# Patient Record
Sex: MALE | Race: WHITE | NOT HISPANIC OR LATINO | Employment: OTHER | URBAN - METROPOLITAN AREA
[De-identification: names, ages, dates, MRNs, and addresses within clinical notes are randomized per-mention and may not be internally consistent; named-entity substitution may affect disease eponyms.]

---

## 2017-01-09 ENCOUNTER — OFFICE VISIT (OUTPATIENT)
Dept: URGENT CARE | Facility: CLINIC | Age: 82
End: 2017-01-09
Payer: MEDICARE

## 2017-01-09 VITALS
TEMPERATURE: 7.9 F | HEIGHT: 70 IN | WEIGHT: 180 LBS | BODY MASS INDEX: 25.77 KG/M2 | HEART RATE: 78 BPM | SYSTOLIC BLOOD PRESSURE: 114 MMHG | RESPIRATION RATE: 16 BRPM | OXYGEN SATURATION: 98 % | DIASTOLIC BLOOD PRESSURE: 58 MMHG

## 2017-01-09 DIAGNOSIS — H61.23 BILATERAL IMPACTED CERUMEN: ICD-10-CM

## 2017-01-09 DIAGNOSIS — H65.192 ACUTE EFFUSION OF LEFT EAR: ICD-10-CM

## 2017-01-09 PROCEDURE — 99214 OFFICE O/P EST MOD 30 MIN: CPT | Performed by: PHYSICIAN ASSISTANT

## 2017-01-09 ASSESSMENT — ENCOUNTER SYMPTOMS
SORE THROAT: 0
SHORTNESS OF BREATH: 0
COUGH: 0
FEVER: 0
HEADACHES: 0
PALPITATIONS: 0
WHEEZING: 0
CHILLS: 0

## 2017-01-09 NOTE — MR AVS SNAPSHOT
"        Gt Martinez   2017 4:45 PM   Office Visit   MRN: [id number]    Department:  [facility]   Dept Phone:  [phone]    Description:  Male : 1935   Provider:  Alban Wise PA-C           Reason for Visit     Cerumen Impaction thinking wax, ear popping       Allergies as of 2017     No Known Allergies      You were diagnosed with     Bilateral impacted cerumen   [241443]       Acute effusion of left ear   [1839672]         Vital Signs     Blood Pressure Pulse Temperature Respirations Height Weight    114/58 mmHg 78 -13.4 °C (7.9 °F) 16 1.778 m (5' 10\") 81.647 kg (180 lb)    Body Mass Index Oxygen Saturation Smoking Status             25.83 kg/m2 98% Never Smoker          Basic Information     Date Of Birth Sex Race Ethnicity Preferred Language    1935 Male White Non- English      Problem List              ICD-10-CM Priority Class Noted - Resolved    Trigger finger, acquired M65.30   2014 - Present      Health Maintenance        Date Due Completion Dates    IMM DTaP/Tdap/Td Vaccine (1 - Tdap) 7/3/1956 ---    COLONOSCOPY 7/3/1987 ---    IMM ZOSTER VACCINE 7/3/1997 ---    IMM PNEUMOCOCCAL 65+ (ADULT) LOW/MEDIUM RISK SERIES (1 of 2 - PCV13) 7/3/2002 ---    IMM INFLUENZA (1) 2016 ---            Current Immunizations     No immunizations on file.      Below and/or attached are the medications your provider expects you to take. Review all of your home medications and newly ordered medications with your provider and/or pharmacist. Follow medication instructions as directed by your provider and/or pharmacist. Please keep your medication list with you and share with your provider. Update the information when medications are discontinued, doses are changed, or new medications (including over-the-counter products) are added; and carry medication information at all times in the event of emergency situations     Allergies:  No Known Allergies          Medications  Valid as " of: January 09, 2017 -  5:47 PM    Generic Name Brand Name Tablet Size Instructions for use    Aspirin (Tablet Delayed Response) ECOTRIN 81 MG Take 81 mg by mouth every day.        Azithromycin (Tab) ZITHROMAX 250 MG Z-pack, Use as directed        DiazePAM (Tab) VALIUM 5 MG Take 5 mg by mouth every 6 hours as needed.        Dutasteride (Cap) AVODART 0.5 MG Take 1 Cap by mouth every day.        Eszopiclone (Tab) Eszopiclone 2 MG Take  by mouth.        Hydrocod Polst-Chlorphen Polst (Liquid CR) TUSSIONEX 10-8 MG/5ML Take 5 mL by mouth every 12 hours.        Meloxicam (Tab) MOBIC 7.5 MG Take 7.5 mg by mouth every day.        Misc Natural Products (Tab) OSTEO BI-FLEX ADV DOUBLE ST  Take  by mouth every day.        Multiple Vitamins-Minerals (Tab) THERAGRAN-M  Take 1 Tab by mouth every day.        Omega-3 Fatty Acids (Cap) OMEGA 3 FA 1000 MG Take 1,000 mg by mouth every day.        Potassium   Take  by mouth.        Simvastatin (Tab) ZOCOR 40 MG Take 1 Tab by mouth every evening.        .                 Medicines prescribed today were sent to:     [facility] #[id number] - [place], [place] - [address] [address]    Phone: [phone] Fax: [phone]    Open 24 Hours?: No      Medication refill instructions:       If your prescription bottle indicates you have medication refills left, it is not necessary to call your provider’s office. Please contact your pharmacy and they will refill your medication.    If your prescription bottle indicates you do not have any refills left, you may request refills at any time through one of the following ways: The online [employer] system (except Urgent Care), by calling your provider’s office, or by asking your pharmacy to contact your provider’s office with a refill request. Medication refills are processed only during regular business hours and may not be available until the next business day. Your provider may request additional information or  to have a follow-up visit with you prior to refilling your medication.   *Please Note: Medication refills are assigned a new Rx number when refilled electronically. Your pharmacy may indicate that no refills were authorized even though a new prescription for the same medication is available at the pharmacy. Please request the medicine by name with the pharmacy before contacting your provider for a refill.        Instructions    e4          [employer] Access Code: Activation code not generated  Current [employer] Status: Active

## 2017-01-10 NOTE — PROGRESS NOTES
Subjective:      Gt Martinez is a 81 y.o. male who presents with Cerumen Impaction            Cerumen Impaction  This is a new problem. Episode onset: one week. The problem has been unchanged. Pertinent negatives include no chest pain, chills, congestion, coughing, fever, headaches or sore throat. Nothing aggravates the symptoms. Treatments tried: eardrops. The treatment provided no relief.       Review of Systems   Constitutional: Negative for fever and chills.   HENT: Positive for ear pain. Negative for congestion and sore throat.    Respiratory: Negative for cough, shortness of breath and wheezing.    Cardiovascular: Negative for chest pain and palpitations.   Neurological: Negative for headaches.     All other systems reviewed and are negative.  PMH:  has a past medical history of Heart burn; Cholesterol blood decreased; and Trigger ring finger of left hand.  MEDS:   Current outpatient prescriptions:   •  Potassium (POTASSIMIN PO), Take  by mouth., Disp: , Rfl:   •  azithromycin (ZITHROMAX) 250 MG Tab, Z-pack, Use as directed, Disp: 6 Tab, Rfl: 0  •  hydrocod polst-chlorphen polst (TUSSIONEX) 10-8 MG/5ML Liquid CR, Take 5 mL by mouth every 12 hours., Disp: 120 mL, Rfl: 0  •  Eszopiclone (LUNESTA) 2 MG TABS, Take  by mouth., Disp: , Rfl:   •  diazepam (VALIUM) 5 MG TABS, Take 5 mg by mouth every 6 hours as needed., Disp: , Rfl:   •  meloxicam (MOBIC) 7.5 MG TABS, Take 7.5 mg by mouth every day., Disp: , Rfl:   •  aspirin EC (ECOTRIN) 81 MG TBEC, Take 81 mg by mouth every day., Disp: , Rfl:   •  docosahexanoic acid (OMEGA 3 FA) 1000 MG CAPS, Take 1,000 mg by mouth every day., Disp: , Rfl:   •  therapeutic multivitamin-minerals (THERAGRAN-M) TABS, Take 1 Tab by mouth every day., Disp: , Rfl:   •  Misc Natural Products (OSTEO BI-FLEX ADV DOUBLE ST) TABS, Take  by mouth every day., Disp: , Rfl:   •  simvastatin (ZOCOR) 40 MG TABS, Take 1 Tab by mouth every evening., Disp: , Rfl:   •  dutaseride (AVODART) 0.5 MG  "capsule, Take 1 Cap by mouth every day., Disp: , Rfl:   ALLERGIES: No Known Allergies  SURGHX:   Past Surgical History   Procedure Laterality Date   • Other orthopedic surgery       bilat knees, lt shoulder, rt elbow   • Trigger finger release  4/22/2014     Performed by Glenny Martinez M.D. at SURGERY [facility]     SOCHX:  reports that he has never smoked. He has never used smokeless tobacco. He reports that he drinks about 0.5 oz of alcohol per week. He reports that he does not use illicit drugs.  FH: Family history was reviewed, no pertinent findings to report  Medications, Allergies, and current problem list reviewed today in Epic       Objective:     /58 mmHg  Pulse 78  Temp(Src) -13.4 °C (7.9 °F)  Resp 16  Ht 1.778 m (5' 10\")  Wt 81.647 kg (180 lb)  BMI 25.83 kg/m2  SpO2 98%     Physical Exam   Constitutional: He is oriented to person, place, and time. He appears well-developed and well-nourished.   HENT:   Head: Normocephalic and atraumatic.   Right Ear: Hearing, external ear and ear canal normal. A middle ear effusion is present.   Left Ear: Hearing, tympanic membrane, external ear and ear canal normal.   Mouth/Throat: Uvula is midline, oropharynx is clear and moist and mucous membranes are normal.   Neck: Normal range of motion. Neck supple.   Cardiovascular: Normal rate, regular rhythm, normal heart sounds and intact distal pulses.  Exam reveals no gallop and no friction rub.    No murmur heard.  Pulmonary/Chest: Effort normal and breath sounds normal. No accessory muscle usage. No apnea, no tachypnea and no bradypnea. No respiratory distress. He has no decreased breath sounds. He has no wheezes. He has no rhonchi. He has no rales. He exhibits no tenderness.   Neurological: He is alert and oriented to person, place, and time.   Skin: Skin is warm and dry.   Psychiatric: He has a normal mood and affect. His behavior is normal. Judgment and thought content normal.   Vitals reviewed.       "        Assessment/Plan:   Patient is a 81 year-old male who presents with left ear pain for 7 days. Patient denies fever chills night sweats. History of cerumen impaction. Patient has tried nothing for the symptoms. Vital signs are normal. Physical exam shows bilateral cerumen impaction. Cerumen was dislodged by ear lavage. Left ear effusion was appreciated.    1. Bilateral impacted cerumen  -Ear Lavage    2. Acute effusion of left ear  -Sudafed    Differential diagnosis, natural history, supportive care, and indications for immediate follow-up discussed at length.   Follow-up with primary care provider within 4-5 days, emergency room precautions discussed.  Patient and/or family appears understanding of information.

## 2017-05-13 ENCOUNTER — APPOINTMENT (OUTPATIENT)
Dept: RADIOLOGY | Facility: IMAGING CENTER | Age: 82
End: 2017-05-13
Attending: PHYSICIAN ASSISTANT
Payer: MEDICARE

## 2017-05-13 ENCOUNTER — OFFICE VISIT (OUTPATIENT)
Dept: URGENT CARE | Facility: CLINIC | Age: 82
End: 2017-05-13
Payer: MEDICARE

## 2017-05-13 VITALS
WEIGHT: 175 LBS | BODY MASS INDEX: 25.11 KG/M2 | HEART RATE: 74 BPM | OXYGEN SATURATION: 98 % | RESPIRATION RATE: 16 BRPM | DIASTOLIC BLOOD PRESSURE: 58 MMHG | TEMPERATURE: 98.2 F | SYSTOLIC BLOOD PRESSURE: 108 MMHG

## 2017-05-13 DIAGNOSIS — S92.912A FRACTURE OF DISTAL PHALANX OF TOE OF LEFT FOOT: ICD-10-CM

## 2017-05-13 DIAGNOSIS — S99.922A INJURY OF TOE ON LEFT FOOT, INITIAL ENCOUNTER: ICD-10-CM

## 2017-05-13 PROCEDURE — G8432 DEP SCR NOT DOC, RNG: HCPCS | Performed by: PHYSICIAN ASSISTANT

## 2017-05-13 PROCEDURE — 99213 OFFICE O/P EST LOW 20 MIN: CPT | Performed by: PHYSICIAN ASSISTANT

## 2017-05-13 PROCEDURE — 1036F TOBACCO NON-USER: CPT | Performed by: PHYSICIAN ASSISTANT

## 2017-05-13 PROCEDURE — 1101F PT FALLS ASSESS-DOCD LE1/YR: CPT | Mod: 8P | Performed by: PHYSICIAN ASSISTANT

## 2017-05-13 PROCEDURE — 73660 X-RAY EXAM OF TOE(S): CPT | Mod: TC,LT | Performed by: PHYSICIAN ASSISTANT

## 2017-05-13 PROCEDURE — G8419 CALC BMI OUT NRM PARAM NOF/U: HCPCS | Performed by: PHYSICIAN ASSISTANT

## 2017-05-13 PROCEDURE — 4040F PNEUMOC VAC/ADMIN/RCVD: CPT | Mod: 8P | Performed by: PHYSICIAN ASSISTANT

## 2017-05-13 NOTE — MR AVS SNAPSHOT
Gt Martinez   2017 3:30 PM   Office Visit   MRN: [id number]    Department:  [facility]   Dept Phone:  [phone]    Description:  Male : 1935   Provider:  Mee Gilbert PA-C           Reason for Visit     Toe Injury big toe left foot      Allergies as of 2017     No Known Allergies      You were diagnosed with     Injury of toe on left foot, initial encounter   [[id number]]         Vital Signs     Blood Pressure Pulse Temperature Respirations Weight Oxygen Saturation    108/58 mmHg 74 36.8 °C (98.2 °F) 16 79.379 kg (175 lb) 98%    Smoking Status                   Never Smoker            Basic Information     Date Of Birth Sex Race Ethnicity Preferred Language    1935 Male White Non- English      Problem List              ICD-10-CM Priority Class Noted - Resolved    Trigger finger, acquired M65.30   2014 - Present      Health Maintenance        Date Due Completion Dates    IMM DTaP/Tdap/Td Vaccine (1 - Tdap) 7/3/1956 ---    COLONOSCOPY 7/3/1987 ---    IMM ZOSTER VACCINE 7/3/1997 ---    IMM PNEUMOCOCCAL 65+ (ADULT) LOW/MEDIUM RISK SERIES (1 of 2 - PCV13) 7/3/2002 ---            Current Immunizations     No immunizations on file.      Below and/or attached are the medications your provider expects you to take. Review all of your home medications and newly ordered medications with your provider and/or pharmacist. Follow medication instructions as directed by your provider and/or pharmacist. Please keep your medication list with you and share with your provider. Update the information when medications are discontinued, doses are changed, or new medications (including over-the-counter products) are added; and carry medication information at all times in the event of emergency situations     Allergies:  No Known Allergies          Medications  Valid as of: May 13, 2017 -  4:44 PM    Generic Name Brand Name Tablet Size Instructions for use    Aspirin (Tablet Delayed  Response) ECOTRIN 81 MG Take 81 mg by mouth every day.        Azithromycin (Tab) ZITHROMAX 250 MG Z-pack, Use as directed        DiazePAM (Tab) VALIUM 5 MG Take 5 mg by mouth every 6 hours as needed.        Dutasteride (Cap) AVODART 0.5 MG Take 1 Cap by mouth every day.        Eszopiclone (Tab) Eszopiclone 2 MG Take  by mouth.        Hydrocod Polst-Chlorphen Polst (Liquid CR) TUSSIONEX 10-8 MG/5ML Take 5 mL by mouth every 12 hours.        Meloxicam (Tab) MOBIC 7.5 MG Take 7.5 mg by mouth every day.        Misc Natural Products (Tab) OSTEO BI-FLEX ADV DOUBLE ST  Take  by mouth every day.        Multiple Vitamins-Minerals (Tab) THERAGRAN-M  Take 1 Tab by mouth every day.        Omega-3 Fatty Acids (Cap) OMEGA 3 FA 1000 MG Take 1,000 mg by mouth every day.        Potassium   Take  by mouth.        Simvastatin (Tab) ZOCOR 40 MG Take 1 Tab by mouth every evening.        .                 Medicines prescribed today were sent to:     [facility] #[id number] [place], [place] - [address] [place] [address]    Phone: [phone] Fax: [phone]    Open 24 Hours?: No      Medication refill instructions:       If your prescription bottle indicates you have medication refills left, it is not necessary to call your provider’s office. Please contact your pharmacy and they will refill your medication.    If your prescription bottle indicates you do not have any refills left, you may request refills at any time through one of the following ways: The online [employer] system (except Urgent Care), by calling your provider’s office, or by asking your pharmacy to contact your provider’s office with a refill request. Medication refills are processed only during regular business hours and may not be available until the next business day. Your provider may request additional information or to have a follow-up visit with you prior to refilling your medication.   *Please Note: Medication refills are assigned  a new Rx number when refilled electronically. Your pharmacy may indicate that no refills were authorized even though a new prescription for the same medication is available at the pharmacy. Please request the medicine by name with the pharmacy before contacting your provider for a refill.        Your To Do List     Future Labs/Procedures Complete By Expires    DX-TOE(S) 2+ LEFT  As directed 5/13/2018         [employer] Access Code: Activation code not generated  Current [employer] Status: Active

## 2017-05-20 ASSESSMENT — ENCOUNTER SYMPTOMS
BRUISES/BLEEDS EASILY: 0
CONSTITUTIONAL NEGATIVE: 1
NEUROLOGICAL NEGATIVE: 1

## 2017-05-21 NOTE — PROGRESS NOTES
"Subjective:      Gt Martinez is a 81 y.o. male who presents with Toe Injury        Toe Injury    Patient presents today for left great toe injury sustained yesterday.  He is concerned he broke it.  He hit it against corner and it pulled the toe to the side, \"stubbed it\"  He has noticed some swelling. He has been able to put on a shoe and walk on it but has been painful.  No attempted interventions at this point other than coming here.     Review of Systems   Constitutional: Negative.    Musculoskeletal:        SEE HPI, LEFT GREAT TOE   Skin: Negative.    Neurological: Negative.    Endo/Heme/Allergies: Does not bruise/bleed easily.   All other systems reviewed and are negative.     PMH:  has a past medical history of Heart burn; Cholesterol blood decreased; and Trigger ring finger of left hand.  MEDS:   Current outpatient prescriptions:   •  meloxicam (MOBIC) 7.5 MG TABS, Take 7.5 mg by mouth every day., Disp: , Rfl:   •  dutaseride (AVODART) 0.5 MG capsule, Take 1 Cap by mouth every day., Disp: , Rfl:   •  Potassium (POTASSIMIN PO), Take  by mouth., Disp: , Rfl:   •  azithromycin (ZITHROMAX) 250 MG Tab, Z-pack, Use as directed, Disp: 6 Tab, Rfl: 0  •  hydrocod polst-chlorphen polst (TUSSIONEX) 10-8 MG/5ML Liquid CR, Take 5 mL by mouth every 12 hours., Disp: 120 mL, Rfl: 0  •  Eszopiclone (LUNESTA) 2 MG TABS, Take  by mouth., Disp: , Rfl:   •  diazepam (VALIUM) 5 MG TABS, Take 5 mg by mouth every 6 hours as needed., Disp: , Rfl:   •  aspirin EC (ECOTRIN) 81 MG TBEC, Take 81 mg by mouth every day., Disp: , Rfl:   •  docosahexanoic acid (OMEGA 3 FA) 1000 MG CAPS, Take 1,000 mg by mouth every day., Disp: , Rfl:   •  therapeutic multivitamin-minerals (THERAGRAN-M) TABS, Take 1 Tab by mouth every day., Disp: , Rfl:   •  Misc Natural Products (OSTEO BI-FLEX ADV DOUBLE ST) TABS, Take  by mouth every day., Disp: , Rfl:   •  simvastatin (ZOCOR) 40 MG TABS, Take 1 Tab by mouth every evening., Disp: , Rfl:   ALLERGIES: No " Known Allergies  SURGHX:   Past Surgical History   Procedure Laterality Date   • Other orthopedic surgery       bilat knees, lt shoulder, rt elbow   • Trigger finger release  4/22/2014     Performed by Glenny Martinez M.D. at SURGERY [facility]     SOCHX:  reports that he has never smoked. He has never used smokeless tobacco. He reports that he drinks about 0.5 oz of alcohol per week. He reports that he does not use illicit drugs.  FH: Family history was reviewed, no pertinent findings to report     Objective:     /58 mmHg  Pulse 74  Temp(Src) 36.8 °C (98.2 °F)  Resp 16  Wt 79.379 kg (175 lb)  SpO2 98%     Physical Exam   Constitutional: He is oriented to person, place, and time. He appears well-developed and well-nourished. No distress.   HENT:   Head: Normocephalic and atraumatic.   Cardiovascular: Normal rate and regular rhythm.    Pulmonary/Chest: Effort normal and breath sounds normal.   Musculoskeletal:        Feet:    Neurological: He is alert and oriented to person, place, and time.   Skin: Skin is warm and dry. No rash noted.   Vitals reviewed.       RAD        Impression        Possible nondisplaced fracture involving the base of the first distal phalanx at the lateral aspect.         Reading Provider Reading Date     Kenny Galarza M.D. May 13, 2017        Assessment/Plan:     1. Fracture of distal phalanx of toe of left foot  DX-TOE(S) 2+ LEFT     -RAD as above.   -pura taped and with padding placed between digits, post-op shoe for support.   -elevate, rest, ice.  NSAID/Tylenol for pain, declined anything stronger  -PCP follow up, Ortho or RTC if pain not improving with treatment or worsens at anytime.     Supportive care, differential diagnoses, and indications for immediate follow-up discussed with patient.   Pathogenesis of diagnosis discussed including typical length and natural progression.   Instructed to return to clinic or nearest emergency department for any change in  condition, further concerns, or worsening of symptoms.  Patient states understanding of the plan of care and discharge instructions.  Instructed to make an appointment, for follow up, with their primary care provider.      Mee Gilbert PA-C

## 2017-08-04 ENCOUNTER — HOSPITAL ENCOUNTER (OUTPATIENT)
Dept: LAB | Facility: MEDICAL CENTER | Age: 82
End: 2017-08-04
Attending: FAMILY MEDICINE
Payer: MEDICARE

## 2017-08-04 LAB
ALBUMIN SERPL BCP-MCNC: 4.1 G/DL (ref 3.2–4.9)
ALBUMIN/GLOB SERPL: 1.4 G/DL
ALP SERPL-CCNC: 50 U/L (ref 30–99)
ALT SERPL-CCNC: 12 U/L (ref 2–50)
ANION GAP SERPL CALC-SCNC: 6 MMOL/L (ref 0–11.9)
AST SERPL-CCNC: 17 U/L (ref 12–45)
BASOPHILS # BLD AUTO: 0.9 % (ref 0–1.8)
BASOPHILS # BLD: 0.05 K/UL (ref 0–0.12)
BILIRUB SERPL-MCNC: 0.5 MG/DL (ref 0.1–1.5)
BUN SERPL-MCNC: 21 MG/DL (ref 8–22)
CALCIUM SERPL-MCNC: 9.3 MG/DL (ref 8.5–10.5)
CHLORIDE SERPL-SCNC: 105 MMOL/L (ref 96–112)
CHOLEST SERPL-MCNC: 182 MG/DL (ref 100–199)
CO2 SERPL-SCNC: 26 MMOL/L (ref 20–33)
CREAT SERPL-MCNC: 0.78 MG/DL (ref 0.5–1.4)
EOSINOPHIL # BLD AUTO: 0.19 K/UL (ref 0–0.51)
EOSINOPHIL NFR BLD: 3.2 % (ref 0–6.9)
ERYTHROCYTE [DISTWIDTH] IN BLOOD BY AUTOMATED COUNT: 47.2 FL (ref 35.9–50)
GFR SERPL CREATININE-BSD FRML MDRD: >60 ML/MIN/1.73 M 2
GLOBULIN SER CALC-MCNC: 3 G/DL (ref 1.9–3.5)
GLUCOSE SERPL-MCNC: 98 MG/DL (ref 65–99)
HCT VFR BLD AUTO: 43.5 % (ref 42–52)
HDLC SERPL-MCNC: 44 MG/DL
HGB BLD-MCNC: 13.8 G/DL (ref 14–18)
IMM GRANULOCYTES # BLD AUTO: 0.01 K/UL (ref 0–0.11)
IMM GRANULOCYTES NFR BLD AUTO: 0.2 % (ref 0–0.9)
LDLC SERPL CALC-MCNC: 105 MG/DL
LYMPHOCYTES # BLD AUTO: 2.11 K/UL (ref 1–4.8)
LYMPHOCYTES NFR BLD: 36.1 % (ref 22–41)
MCH RBC QN AUTO: 29.4 PG (ref 27–33)
MCHC RBC AUTO-ENTMCNC: 31.7 G/DL (ref 33.7–35.3)
MCV RBC AUTO: 92.8 FL (ref 81.4–97.8)
MONOCYTES # BLD AUTO: 0.55 K/UL (ref 0–0.85)
MONOCYTES NFR BLD AUTO: 9.4 % (ref 0–13.4)
NEUTROPHILS # BLD AUTO: 2.94 K/UL (ref 1.82–7.42)
NEUTROPHILS NFR BLD: 50.2 % (ref 44–72)
NRBC # BLD AUTO: 0 K/UL
NRBC BLD AUTO-RTO: 0 /100 WBC
PLATELET # BLD AUTO: 176 K/UL (ref 164–446)
PMV BLD AUTO: 12.5 FL (ref 9–12.9)
POTASSIUM SERPL-SCNC: 4.6 MMOL/L (ref 3.6–5.5)
PROT SERPL-MCNC: 7.1 G/DL (ref 6–8.2)
PSA SERPL-MCNC: 0.76 NG/ML (ref 0–4)
RBC # BLD AUTO: 4.69 M/UL (ref 4.7–6.1)
SODIUM SERPL-SCNC: 137 MMOL/L (ref 135–145)
T4 SERPL-MCNC: 7.2 UG/DL (ref 4–12)
TRIGL SERPL-MCNC: 163 MG/DL (ref 0–149)
TSH SERPL DL<=0.005 MIU/L-ACNC: 2.32 UIU/ML (ref 0.3–3.7)
WBC # BLD AUTO: 5.9 K/UL (ref 4.8–10.8)

## 2017-08-04 PROCEDURE — 36415 COLL VENOUS BLD VENIPUNCTURE: CPT

## 2017-08-04 PROCEDURE — 80061 LIPID PANEL: CPT

## 2017-08-04 PROCEDURE — 84443 ASSAY THYROID STIM HORMONE: CPT

## 2017-08-04 PROCEDURE — 85025 COMPLETE CBC W/AUTO DIFF WBC: CPT

## 2017-08-04 PROCEDURE — 84479 ASSAY OF THYROID (T3 OR T4): CPT

## 2017-08-04 PROCEDURE — 84153 ASSAY OF PSA TOTAL: CPT

## 2017-08-04 PROCEDURE — 84436 ASSAY OF TOTAL THYROXINE: CPT

## 2017-08-04 PROCEDURE — 80053 COMPREHEN METABOLIC PANEL: CPT

## 2017-08-06 LAB — T3RU NFR SERPL: 33 % (ref 28–41)

## 2017-09-22 ENCOUNTER — OFFICE VISIT (OUTPATIENT)
Dept: URGENT CARE | Facility: CLINIC | Age: 82
End: 2017-09-22
Payer: MEDICARE

## 2017-09-22 VITALS
TEMPERATURE: 98 F | RESPIRATION RATE: 14 BRPM | DIASTOLIC BLOOD PRESSURE: 72 MMHG | HEART RATE: 65 BPM | BODY MASS INDEX: 23.77 KG/M2 | OXYGEN SATURATION: 94 % | WEIGHT: 166 LBS | HEIGHT: 70 IN | SYSTOLIC BLOOD PRESSURE: 118 MMHG

## 2017-09-22 DIAGNOSIS — T14.8XXA OPEN BITE WOUND OF SKIN: Primary | ICD-10-CM
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
## 2017-09-22 PROCEDURE — 99214 OFFICE O/P EST MOD 30 MIN: CPT | Performed by: FAMILY MEDICINE

## 2017-09-22 RX ORDER — AMOXICILLIN AND CLAVULANATE POTASSIUM 875; 125 MG/1; MG/1
1 TABLET, FILM COATED ORAL 2 TIMES DAILY
Qty: 6 TAB | Refills: 0 | Status: SHIPPED | OUTPATIENT
Start: 2017-09-22 | End: 2017-09-25

## 2017-09-22 ASSESSMENT — ENCOUNTER SYMPTOMS
DIZZINESS: 0
FEVER: 0
ROS SKIN COMMENTS: WOUND
FOCAL WEAKNESS: 0
SENSORY CHANGE: 1
CHILLS: 0

## 2017-09-22 NOTE — PROGRESS NOTES
Subjective:      Gt Martinez is a 82 y.o. male who presents with Animal Bite    Chief Complaint   Patient presents with   • Animal Bite        - This is a very pleasant 82 y.o. male with complaints of his dog bit him a few hrs ago. He was trying to restrain the dog so he could clip its nails. Dog is known to have bad temperament. Dog utd on shots. Patient thinks he is utd on Td but will check his records later and let us know.            ALLERGIES:  Review of patient's allergies indicates no known allergies.     PMH:  Past Medical History:   Diagnosis Date   • Cholesterol blood decreased    • Heart burn    • Trigger ring finger of left hand         MEDS:    Current Outpatient Prescriptions:   •  amoxicillin-clavulanate (AUGMENTIN) 875-125 MG Tab, Take 1 Tab by mouth 2 times a day for 3 days., Disp: 6 Tab, Rfl: 0  •  Potassium (POTASSIMIN PO), Take  by mouth., Disp: , Rfl:   •  diazepam (VALIUM) 5 MG TABS, Take 5 mg by mouth every 6 hours as needed., Disp: , Rfl:   •  meloxicam (MOBIC) 7.5 MG TABS, Take 7.5 mg by mouth every day., Disp: , Rfl:   •  aspirin EC (ECOTRIN) 81 MG TBEC, Take 81 mg by mouth every day., Disp: , Rfl:   •  docosahexanoic acid (OMEGA 3 FA) 1000 MG CAPS, Take 1,000 mg by mouth every day., Disp: , Rfl:   •  therapeutic multivitamin-minerals (THERAGRAN-M) TABS, Take 1 Tab by mouth every day., Disp: , Rfl:   •  Misc Natural Products (OSTEO BI-FLEX ADV DOUBLE ST) TABS, Take  by mouth every day., Disp: , Rfl:   •  simvastatin (ZOCOR) 40 MG TABS, Take 1 Tab by mouth every evening., Disp: , Rfl:   •  dutaseride (AVODART) 0.5 MG capsule, Take 1 Cap by mouth every day., Disp: , Rfl:     ** I have documented what I find to be significant in regards to past medical, social, family and surgical history  in my HPI or under PMH/PSH/FH review section, otherwise it is contributory **           HPI    Review of Systems   Constitutional: Negative for chills and fever.   Skin:        wound   Neurological:  "Positive for sensory change. Negative for dizziness and focal weakness.          Objective:     /72   Pulse 65   Temp 36.7 °C (98 °F)   Resp 14   Ht 1.778 m (5' 10\")   Wt 75.3 kg (166 lb)   SpO2 94%   BMI 23.82 kg/m²      Physical Exam   Constitutional: He appears well-developed. No distress.   HENT:   Head: Normocephalic and atraumatic.   Eyes: Conjunctivae are normal.   Neck: Neck supple.   Cardiovascular: Regular rhythm.    No murmur heard.  Pulmonary/Chest: Effort normal. No respiratory distress.   Neurological: He is alert. He exhibits normal muscle tone.   Skin: Skin is warm and dry.   Psychiatric: He has a normal mood and affect. Judgment normal.   Nursing note and vitals reviewed.  Lt ring finger: full srom, + abrasion            Assessment/Plan:         1. Open bite wound of skin  amoxicillin-clavulanate (AUGMENTIN) 875-125 MG Tab   2. Dog bite, initial encounter           - wound cleaned/dressed/splinted   - 24hr recheck.     Dx & d/c instructions discussed w/ patient and/or family members. Follow up w/ Prvt Dr or here in 3-4 days if not getting better, sooner if needed,  ER if worse and UC/PCP unavailable.        Possible side effects (i.e. Rash, GI upset/constipation, sedation, elevation of BP or sugars) of any medications given discussed.                "

## 2018-02-27 ENCOUNTER — APPOINTMENT (OUTPATIENT)
Dept: MEDICAL GROUP | Facility: MEDICAL CENTER | Age: 83
End: 2018-02-27
Payer: MEDICARE

## 2018-03-27 ENCOUNTER — HOSPITAL ENCOUNTER (OUTPATIENT)
Facility: MEDICAL CENTER | Age: 83
End: 2018-03-27
Attending: FAMILY MEDICINE
Payer: MEDICARE

## 2018-03-27 ENCOUNTER — OFFICE VISIT (OUTPATIENT)
Dept: MEDICAL GROUP | Facility: MEDICAL CENTER | Age: 83
End: 2018-03-27
Payer: MEDICARE

## 2018-03-27 VITALS
HEIGHT: 70 IN | BODY MASS INDEX: 25.91 KG/M2 | OXYGEN SATURATION: 95 % | WEIGHT: 181 LBS | HEART RATE: 64 BPM | TEMPERATURE: 99.1 F | SYSTOLIC BLOOD PRESSURE: 122 MMHG | DIASTOLIC BLOOD PRESSURE: 64 MMHG

## 2018-03-27 DIAGNOSIS — Z87.39 HISTORY OF TRIGGER FINGER: ICD-10-CM

## 2018-03-27 DIAGNOSIS — G47.09 OTHER INSOMNIA: ICD-10-CM

## 2018-03-27 DIAGNOSIS — N40.0 BENIGN PROSTATIC HYPERPLASIA WITHOUT LOWER URINARY TRACT SYMPTOMS: ICD-10-CM

## 2018-03-27 DIAGNOSIS — M25.512 ACUTE PAIN OF LEFT SHOULDER: ICD-10-CM

## 2018-03-27 DIAGNOSIS — E78.2 MIXED HYPERLIPIDEMIA: ICD-10-CM

## 2018-03-27 DIAGNOSIS — F41.9 ANXIETY: ICD-10-CM

## 2018-03-27 DIAGNOSIS — K59.09 CHRONIC CONSTIPATION: ICD-10-CM

## 2018-03-27 DIAGNOSIS — K21.9 GASTROESOPHAGEAL REFLUX DISEASE WITHOUT ESOPHAGITIS: ICD-10-CM

## 2018-03-27 DIAGNOSIS — Z76.89 ENCOUNTER TO ESTABLISH CARE WITH NEW DOCTOR: ICD-10-CM

## 2018-03-27 PROBLEM — K21.00 GASTROESOPHAGEAL REFLUX DISEASE WITH ESOPHAGITIS: Status: RESOLVED | Noted: 2018-03-27 | Resolved: 2018-03-27

## 2018-03-27 PROBLEM — K21.00 GASTROESOPHAGEAL REFLUX DISEASE WITH ESOPHAGITIS: Status: ACTIVE | Noted: 2018-03-27

## 2018-03-27 PROCEDURE — 99215 OFFICE O/P EST HI 40 MIN: CPT | Performed by: FAMILY MEDICINE

## 2018-03-27 PROCEDURE — 80307 DRUG TEST PRSMV CHEM ANLYZR: CPT

## 2018-03-27 RX ORDER — DIAZEPAM 5 MG/1
5 TABLET ORAL
Qty: 30 TAB | Refills: 0 | Status: SHIPPED | OUTPATIENT
Start: 2018-03-27 | End: 2018-05-17 | Stop reason: SDUPTHER

## 2018-03-27 RX ORDER — ESZOPICLONE 2 MG/1
TABLET, FILM COATED ORAL
COMMUNITY
Start: 2018-02-02 | End: 2018-05-17 | Stop reason: SDUPTHER

## 2018-03-27 RX ORDER — DUTASTERIDE 0.5 MG/1
0.5 CAPSULE, LIQUID FILLED ORAL DAILY
Qty: 30 CAP | Refills: 0 | Status: SHIPPED | OUTPATIENT
Start: 2018-03-27 | End: 2018-03-27 | Stop reason: SDUPTHER

## 2018-03-27 RX ORDER — LUBIPROSTONE 24 UG/1
CAPSULE, GELATIN COATED ORAL
COMMUNITY
Start: 2018-03-03

## 2018-03-27 RX ORDER — LANSOPRAZOLE 30 MG/1
CAPSULE, DELAYED RELEASE ORAL
COMMUNITY
Start: 2018-03-03

## 2018-03-27 RX ORDER — DUTASTERIDE 0.5 MG/1
0.5 CAPSULE, LIQUID FILLED ORAL DAILY
Qty: 90 CAP | Refills: 3 | Status: SHIPPED | OUTPATIENT
Start: 2018-03-27 | End: 2018-04-02 | Stop reason: SDUPTHER

## 2018-03-27 ASSESSMENT — PATIENT HEALTH QUESTIONNAIRE - PHQ9: CLINICAL INTERPRETATION OF PHQ2 SCORE: 0

## 2018-03-27 NOTE — ASSESSMENT & PLAN NOTE
Chronic, stable, well controlled on taking Lunesta 1 mg by mouth every at bedtime when necessary to 3 times per week. Patient states that he takes this more often when he has difficulty sleeping due to daytime stresses of his job.

## 2018-03-27 NOTE — LETTER
[facility]  Darian Little M.D.  [address]  [place] [address]  Fax: [phone]   Authorization for Release/Disclosure of   Protected Health Information   Name: ROSARIO MISHRA : 1935 SSN: [id number]   Address: [id number]  [place] [address] Phone:    [phone] (home)    I authorize the entity listed below to release/disclose the PHI below to:   [facility]/Darian Little M.D. and Darian Little M.D.   Provider or Entity Name:  [facility]   Address   [facility], Zip            [address], [place] [address] Phone:  [phone]      Fax:       [phone]          Reason for request: continuity of care   Information to be released:    [ X ] LAST COLONOSCOPY,  including any PATH REPORT and follow-up  [ X ] LAST FIT/COLOGUARD RESULT [  ] LAST DEXA  [  ] LAST MAMMOGRAM  [  ] LAST PAP  [  ] LAST LABS [  ] RETINA EXAM REPORT  [  ] IMMUNIZATION RECORDS  [  ] Release all info      [  ] Check here and initial the line next to each item to release ALL health information INCLUDING  _____ Care and treatment for drug and / or alcohol abuse  _____ HIV testing, infection status, or AIDS  _____ Genetic Testing    DATES OF SERVICE OR TIME PERIOD TO BE DISCLOSED: _____________  I understand and acknowledge that:  * This Authorization may be revoked at any time by you in writing, except if your health information has already been used or disclosed.  * Your health information that will be used or disclosed as a result of you signing this authorization could be re-disclosed by the recipient. If this occurs, your re-disclosed health information may no longer be protected by State or Federal laws.  * You may refuse to sign this Authorization. Your refusal will not affect your ability to obtain treatment.  * This Authorization becomes effective upon signing and will  on (date) __________.      If no date is indicated, this Authorization will  one (1)  year from the signature date.    Name: Gt Martinez    Signature:   Date:     3/27/2018       PLEASE FAX REQUESTED RECORDS BACK TO: [phone]

## 2018-03-27 NOTE — ASSESSMENT & PLAN NOTE
Chronic, stable, well controlled. Patient is taking his Prevacid 30 mg by mouth every other day. Patient would like to eventually get off of this. Patient recently had endoscopy as well as colonoscopy by gastroenterology consultants. Apparently, biopsies were negative.    Of note, patient does have chronic constipation for which she takes Amitiza 24mcg PO BID.    ROS is negative for nausea, emesis, hematochezia, melena, diarrhea.

## 2018-03-28 DIAGNOSIS — F41.9 ANXIETY: ICD-10-CM

## 2018-03-28 LAB
AMPHET UR QL SCN: NEGATIVE
BARBITURATES UR QL SCN: NEGATIVE
BENZODIAZ UR QL SCN: NEGATIVE
BZE UR QL SCN: NEGATIVE
CANNABINOIDS UR QL SCN: POSITIVE
METHADONE UR QL SCN: NEGATIVE
OPIATES UR QL SCN: NEGATIVE
OXYCODONE UR QL SCN: NEGATIVE
PCP UR QL SCN: NEGATIVE
PROPOXYPH UR QL SCN: NEGATIVE

## 2018-03-28 NOTE — ASSESSMENT & PLAN NOTE
Chronic also, well-controlled on taking Valium 5 mg by mouth daily when necessary which she takes 1-2 times per week depending upon work stresses as well as stress that may be induced by his wife.    ROS is NEGATIVE for generalized weakness/fatigue, dizziness, vision/hearing changes, chest pain/pressure, palpitations, dyspnea, tachypnea, intense feelings of dread, insomnia, decreased appetite, persistent nausea.

## 2018-03-28 NOTE — ASSESSMENT & PLAN NOTE
Chronic, stable, well-controlled on Dutasteride.    ROS is NEGATIVE for nocturia, polyuria, increased frequency of urination, urinary hesitancy, feeling of incomplete voiding, difficulty initiation urine stream, hematuria, pyuria

## 2018-04-01 NOTE — ASSESSMENT & PLAN NOTE
Patient reported to have history of hyperlipidemia, is taking his simvastatin as directed, believes that this is well-controlled.  Patient without arthralgias nor joint pains.  Patient without signs/symptoms of angina at present time.  Patient believe that he eats a mostly healthy diet.    ROS is NEGATIVE for dizziness, generalized weakness/fatigue, cold sweats, dizziness,  vision/hearing changes, jaw pain/paresthesias, BUE pain/paresthesias/numbness/weakness, chest pain/pressure, palpitations, dyspnea, nausea, RUQ abdominal pain, oliguria/anuria, BLE edema.

## 2018-04-01 NOTE — ASSESSMENT & PLAN NOTE
Patient had history of trigger finger of left fourth digit. This was addressed in the past by surgery, may have been just prior to that with injections. Currently, patient has decreased strength with this digit, as well as mild decrease in  strength as well. This was discovered on physical exam.

## 2018-04-01 NOTE — PROGRESS NOTES
Subjective:   Chief Complaint/History of Present Illness:  Gt Martinez is a 82 y.o. male patient new to [facility] who presents today to establish primary medical care and to discuss the following medical conditions.      Diagnoses of Encounter to establish care with new doctor, Acute pain of left shoulder, Gastroesophageal reflux disease without esophagitis, Chronic constipation, Other insomnia, Anxiety, Benign prostatic hyperplasia without lower urinary tract symptoms, Mixed hyperlipidemia, and History of trigger finger were pertinent to this visit.    PMH, PSH, Social History, Medications, Allergies all reviewed as documented:    Gastroesophageal reflux disease without esophagitis  Chronic, stable, well controlled. Patient is taking his Prevacid 30 mg by mouth every other day. Patient would like to eventually get off of this. Patient recently had endoscopy as well as colonoscopy by gastroenterology consultants. Apparently, biopsies were negative.    Of note, patient does have chronic constipation for which she takes Amitiza 24mcg PO BID.    ROS is negative for nausea, emesis, hematochezia, melena, diarrhea.    Chronic constipation  Chronic, stable, controlled on taking his Amitiza twice daily.    Other insomnia  Chronic, stable, well controlled on taking Lunesta 1 mg by mouth every at bedtime when necessary to 3 times per week. Patient states that he takes this more often when he has difficulty sleeping due to daytime stresses of his job.    Anxiety  Chronic also, well-controlled on taking Valium 5 mg by mouth daily when necessary which she takes 1-2 times per week depending upon work stresses as well as stress that may be induced by his wife.    ROS is NEGATIVE for generalized weakness/fatigue, dizziness, vision/hearing changes, chest pain/pressure, palpitations, dyspnea, tachypnea, intense feelings of dread, insomnia, decreased appetite, persistent nausea.    Benign prostatic hyperplasia without lower urinary  tract symptoms  Chronic, stable, well-controlled on Dutasteride.    ROS is NEGATIVE for nocturia, polyuria, increased frequency of urination, urinary hesitancy, feeling of incomplete voiding, difficulty initiation urine stream, hematuria, pyuria    Mixed hyperlipidemia  Patient reported to have history of hyperlipidemia, is taking his simvastatin as directed, believes that this is well-controlled.  Patient without arthralgias nor joint pains.  Patient without signs/symptoms of angina at present time.  Patient believe that he eats a mostly healthy diet.    ROS is NEGATIVE for dizziness, generalized weakness/fatigue, cold sweats, dizziness,  vision/hearing changes, jaw pain/paresthesias, BUE pain/paresthesias/numbness/weakness, chest pain/pressure, palpitations, dyspnea, nausea, RUQ abdominal pain, oliguria/anuria, BLE edema.    History of trigger finger  Patient had history of trigger finger of left fourth digit. This was addressed in the past by surgery, may have been just prior to that with injections. Currently, patient has decreased strength with this digit, as well as mild decrease in  strength as well. This was discovered on physical exam.      Patient Active Problem List    Diagnosis Date Noted   • Benign prostatic hyperplasia without lower urinary tract symptoms 03/27/2018   • Other insomnia 03/27/2018   • Mixed hyperlipidemia 03/27/2018   • Gastroesophageal reflux disease without esophagitis 03/27/2018   • Anxiety 03/27/2018   • Chronic constipation 03/27/2018   • Acute pain of left shoulder 03/27/2018   • History of trigger finger 04/22/2014       Additional History:   Allergies:    Patient has no known allergies.     Medications:     Current Outpatient Prescriptions Ordered in [facility]   Medication Sig Dispense Refill   • diazePAM (VALIUM) 5 MG Tab Take 1 Tab by mouth 1 time daily as needed for up to 30 days. 30 Tab 0   • dutasteride (AVODART) 0.5 MG capsule Take 1 Cap by mouth every day. 90 Cap 3   •  AMITIZA 24 MCG capsule      • lansoprazole (PREVACID) 30 MG CAPSULE DELAYED RELEASE      • magnesium oxide (MAG-OX) 400 (241.3 Mg) MG Tab tablet      • Eszopiclone 2 MG Tab      • Potassium (POTASSIMIN PO) Take  by mouth.     • meloxicam (MOBIC) 7.5 MG TABS Take 7.5 mg by mouth every day.     • aspirin EC (ECOTRIN) 81 MG TBEC Take 81 mg by mouth every day.     • docosahexanoic acid (OMEGA 3 FA) 1000 MG CAPS Take 1,000 mg by mouth every day.     • therapeutic multivitamin-minerals (THERAGRAN-M) TABS Take 1 Tab by mouth every day.     • Misc Natural Products (OSTEO BI-FLEX ADV DOUBLE ST) TABS Take  by mouth every day.     • simvastatin (ZOCOR) 40 MG TABS Take 1 Tab by mouth every evening.       No current Epic-ordered facility-administered medications on file.         Past Medical History:     Past Medical History:   Diagnosis Date   • Cholesterol blood decreased    • Heart burn    • Trigger ring finger of left hand         Past Surgical History:     Past Surgical History:   Procedure Laterality Date   • TRIGGER FINGER RELEASE  4/22/2014    Performed by Glenny Martinez M.D. at SURGERY [facility] ORS   • OTHER ORTHOPEDIC SURGERY      bilat knees, lt shoulder, rt elbow        Social History:     Social History   Substance Use Topics   • Smoking status: Never Smoker   • Smokeless tobacco: Never Used   • Alcohol use 3.0 oz/week     5 Glasses of wine per week      Comment: 2-3 per week        Family History:     No family status information on file.      History reviewed. No pertinent family history.    ROS:     - Respiratory: Negative for cough, sputum production, chest congestion, dyspnea, wheezing, and crackles.      - Cardiovascular: Negative for chest pain, palpitations, orthopnea, and bilateral lower extremity edema.     - NOTE: All other systems reviewed and are negative, except as in HPI.     Objective:   Physical Exam:    Vitals: Blood pressure 122/64, pulse 64, temperature 37.3 °C (99.1 °F), height 1.778 m (5'  "10\"), weight 82.1 kg (181 lb), SpO2 95 %.   BMI: Body mass index is 25.97 kg/m².   General/Constitutional: Vitals as above, Well nourished, well developed male in no acute distress   Head/Eyes:  - Head is grossly normal & atraumatic  - Bilateral conjunctivae clear and not injected, bilateral EOMI, bilateral PERRL   ENT:   - Bilateral external ears grossly normal in appearance, external auditory canals clear & bilateral TMs visualized with appropriate cone of light reflex, hearing grossly intact  - External nares normal in appearance and without discharge/bleeding, bilateral turbinates non-erythematous/non-edematous and without discharge/bleeding  - Good dentition,  posterior oropharynx without erythema/edema/exudates  Neck: Neck supple, no masses, neck non-tender to palpation, no thyromegaly/goiter   Respiratory: No respiratory distress, bilateral lungs are clear to auscultation in all lung fields (anterior/lateral/posterior), no wheezing/rhonchi/rales   Cardiovascular: Regular rate and rhythm without murmur/gallops/rubs, distal pulses equal and 2+ bilaterally (radial, posterior tibial), no bilateral lower extremity edema   MSK: Gait grossly normal & not antalgic, no tenderness to percussion of vertebral processes, no CVAT, no bilateral SI joint tenderness, mild bicipital tenderness to palpation, otherwise NEGATIVE exam of bilateral shoulders (shoulder impingement [empty can, Hawkin's, Neer's], rotator cuff strain/sprain [tenderness of rotator cuff on: internal & external rotation against resistance, subscapularis liftoff test], no tenderness on abduction against resistance, no tenderness on adduction against resistance, strength testing 5/5 and equal bilaterally of biceps/triceps),  strength 5/5 on right but 4 to 4+ out of 5 on left   Integumentary: No apparent rashes   Neuro: Gross motor movement intact in all 4 extremities, Gross sensation intact to extremities and trunk, Gait grossly normal and not " antalgic   Psych: Judgment grossly appropriate, no apparent depression/anxiety    Health Maintenance:     - I have requested previous records (Dr. Brent Lazaro), and will update accordingly.    Imaging/Labs:     - 08/2017 -- lipid profile mildly uncontrolled with mild elevation suture gastritis and LDL. Thyroid levels were within normal limits. CMP within normal limits. PSA within normal limits. CBC with minimal anemia, not of concern at present time.    Assessment and Plan:   1. Encounter to establish care with new doctor  Patient transferring primary medical care to me from Dr. rBent Lazaro    2. Acute pain of left shoulder  New problem, uncontrolled, bicipital strain most likely.  Patient given conservative care instructions (NSAIDs, stretching, warm & cold compresses, OTC oral and topical analgesics).    3. Gastroesophageal reflux disease without esophagitis  Chronic, stable, well controlled.  Continue Prevacid as directed.    4. Chronic constipation  Chronic, stable, well controlled. Continue Amitiza as directed.    5. Other insomnia  Chronic, uncontrolled, secondary to life stress. See management as for #6 below.    6. Anxiety  Chronic, uncontrolled, secondary to life stress.  UDS per [facility]'s controlled substance policy.  Diazepam to be taken 2.5mg to 5mg PO QDay PRN.   - diazePAM (VALIUM) 5 MG Tab; Take 1 Tab by mouth 1 time daily as needed for up to 30 days.  Dispense: 30 Tab; Refill: 0   - URINE DRUG SCREEN; Future    7. Benign prostatic hyperplasia without lower urinary tract symptoms  Chronic, stable, controlled. Continue dutasteride as directed.   - dutasteride (AVODART) 0.5 MG capsule; Take 1 Cap by mouth every day.  Dispense: 90 Cap; Refill: 3    8. Mixed hyperlipidemia  Chronic, mildly uncontrolled per lipid profile from August 2017     9. History of trigger finger  Chronic, uncontrolled, with residual deficit of left fourth digit weakness.    NOTE: A total of 60minutes was spent in direct face-to-face  time with the patient, of which over 50% of the time was spent in counseling and/or coordination of care, the contents of which are described in this note.    RTC: in 3months for management of L shoulder pain, GERD, anxiety & insomnia.    PLEASE NOTE: This dictation was created using voice recognition software. I have made every reasonable attempt to correct obvious errors, but I expect that there are errors of grammar and possibly content that I did not discover before finalizing the note.

## 2018-04-02 DIAGNOSIS — N40.0 BENIGN PROSTATIC HYPERPLASIA WITHOUT LOWER URINARY TRACT SYMPTOMS: ICD-10-CM

## 2018-04-02 RX ORDER — DUTASTERIDE 0.5 MG/1
0.5 CAPSULE, LIQUID FILLED ORAL DAILY
Qty: 90 CAP | Refills: 3 | Status: SHIPPED | OUTPATIENT
Start: 2018-04-02

## 2018-04-02 RX ORDER — DUTASTERIDE 0.5 MG/1
0.5 CAPSULE, LIQUID FILLED ORAL DAILY
Qty: 90 CAP | Refills: 3 | Status: SHIPPED | OUTPATIENT
Start: 2018-04-02 | End: 2018-04-02 | Stop reason: SDUPTHER

## 2018-04-02 NOTE — TELEPHONE ENCOUNTER
Was the patient seen in the last year in this department? Yes     Does patient have an active prescription for medications requested? YES    Received Request Via: Pharmacy         Pls send to [facility] Mail order

## 2018-04-05 ENCOUNTER — OFFICE VISIT (OUTPATIENT)
Dept: MEDICAL GROUP | Facility: MEDICAL CENTER | Age: 83
End: 2018-04-05
Payer: MEDICARE

## 2018-04-05 VITALS
SYSTOLIC BLOOD PRESSURE: 130 MMHG | HEART RATE: 61 BPM | WEIGHT: 180 LBS | TEMPERATURE: 98 F | BODY MASS INDEX: 25.83 KG/M2 | OXYGEN SATURATION: 96 % | DIASTOLIC BLOOD PRESSURE: 74 MMHG

## 2018-04-05 DIAGNOSIS — H69.92 EUSTACHIAN TUBE DYSFUNCTION, LEFT: ICD-10-CM

## 2018-04-05 PROCEDURE — 99214 OFFICE O/P EST MOD 30 MIN: CPT | Performed by: FAMILY MEDICINE

## 2018-04-05 RX ORDER — METHYLPREDNISOLONE 4 MG/1
4 TABLET ORAL DAILY
Qty: 30 TAB | Refills: 0 | Status: SHIPPED | OUTPATIENT
Start: 2018-04-05 | End: 2018-05-17

## 2018-04-05 NOTE — PATIENT INSTRUCTIONS
Eustachian Tube Dysfunction  Introduction  The eustachian tube connects the middle ear to the back of the nose. It regulates air pressure in the middle ear by allowing air to move between the ear and nose. It also helps to drain fluid from the middle ear space. When the eustachian tube does not function properly, air pressure, fluid, or both can build up in the middle ear.  Eustachian tube dysfunction can affect one or both ears.  What are the causes?  This condition happens when the eustachian tube becomes blocked or cannot open normally. This may result from:  · Ear infections.  · Colds and other upper respiratory infections.  · Allergies.  · Irritation, such as from cigarette smoke or acid from the stomach coming up into the esophagus (gastroesophageal reflux).  · Sudden changes in air pressure, such as from descending in an airplane.  · Abnormal growths in the nose or throat, such as nasal polyps, tumors, or enlarged tissue at the back of the throat (adenoids).  What increases the risk?  This condition may be more likely to develop in people who smoke and people who are overweight. Eustachian tube dysfunction may also be more likely to develop in children, especially children who have:  · Certain birth defects of the mouth, such as cleft palate.  · Large tonsils and adenoids.  What are the signs or symptoms?  Symptoms of this condition may include:  · A feeling of fullness in the ear.  · Ear pain.  · Clicking or popping noises in the ear.  · Ringing in the ear.  · Hearing loss.  · Loss of balance.  Symptoms may get worse when the air pressure around you changes, such as when you travel to an area of high elevation or fly on an airplane.  How is this diagnosed?  This condition may be diagnosed based on:  · Your symptoms.  · A physical exam of your ear, nose, and throat.  · Tests, such as those that measure:  ¨ The movement of your eardrum (tympanogram).  ¨ Your hearing (audiometry).  How is this  "treated?  Treatment depends on the cause and severity of your condition. If your symptoms are mild, you may be able to relieve your symptoms by moving air into (\"popping\") your ears. If you have symptoms of fluid in your ears, treatment may include:  · Decongestants.  · Antihistamines.  · Nasal sprays or ear drops that contain medicines that reduce swelling (steroids).  In some cases, you may need to have a procedure to drain the fluid in your eardrum (myringotomy). In this procedure, a small tube is placed in the eardrum to:  · Drain the fluid.  · Restore the air in the middle ear space.  Follow these instructions at home:  · Take over-the-counter and prescription medicines only as told by your health care provider.  · Use techniques to help pop your ears as recommended by your health care provider. These may include:  ¨ Chewing gum.  ¨ Yawning.  ¨ Frequent, forceful swallowing.  ¨ Closing your mouth, holding your nose closed, and gently blowing as if you are trying to blow air out of your nose.  · Do not do any of the following until your health care provider approves:  ¨ Travel to high altitudes.  ¨ Fly in airplanes.  ¨ Work in a pressurized cabin or room.  ¨ Scuba dive.  · Keep your ears dry. Dry your ears completely after showering or bathing.  · Do not smoke.  · Keep all follow-up visits as told by your health care provider. This is important.  Contact a health care provider if:  · Your symptoms do not go away after treatment.  · Your symptoms come back after treatment.  · You are unable to pop your ears.  · You have:  ¨ A fever.  ¨ Pain in your ear.  ¨ Pain in your head or neck.  ¨ Fluid draining from your ear.  · Your hearing suddenly changes.  · You become very dizzy.  · You lose your balance.  This information is not intended to replace advice given to you by your health care provider. Make sure you discuss any questions you have with your health care provider.  Document Released: 01/13/2017 Document " Revised: 05/25/2017 Document Reviewed: 01/06/2016  © 2017 [employer]

## 2018-04-10 NOTE — PROGRESS NOTES
Subjective:     Chief Complaint   Patient presents with   • Middle Ear Problem     patient states when he chews it sounds like rubbing together         Gt Martinez is a 82 y.o. male here today for evaluation and management of:    Eustachian tube dysfunction, left  He complains of a popping in his left ear as well as pain in the area. The symptoms seem to come and go but have recently worsened. He has some trouble hearing out of that ear. He denies any fever or chills. No rhinorrhea. No trauma to the ear.       No Known Allergies    Current medicines (including changes today)  Current Outpatient Prescriptions   Medication Sig Dispense Refill   • methylPREDNISolone (MEDROL) 4 MG Tab Take 1 Tab by mouth every day. 30 Tab 0   • dutasteride (AVODART) 0.5 MG capsule Take 1 Cap by mouth every day. 90 Cap 3   • AMITIZA 24 MCG capsule      • lansoprazole (PREVACID) 30 MG CAPSULE DELAYED RELEASE      • magnesium oxide (MAG-OX) 400 (241.3 Mg) MG Tab tablet      • Eszopiclone 2 MG Tab      • diazePAM (VALIUM) 5 MG Tab Take 1 Tab by mouth 1 time daily as needed for up to 30 days. 30 Tab 0   • Potassium (POTASSIMIN PO) Take  by mouth.     • meloxicam (MOBIC) 7.5 MG TABS Take 7.5 mg by mouth every day.     • aspirin EC (ECOTRIN) 81 MG TBEC Take 81 mg by mouth every day.     • docosahexanoic acid (OMEGA 3 FA) 1000 MG CAPS Take 1,000 mg by mouth every day.     • therapeutic multivitamin-minerals (THERAGRAN-M) TABS Take 1 Tab by mouth every day.     • Misc Natural Products (OSTEO BI-FLEX ADV DOUBLE ST) TABS Take  by mouth every day.     • simvastatin (ZOCOR) 40 MG TABS Take 1 Tab by mouth every evening.       No current facility-administered medications for this visit.        He  has a past medical history of Cholesterol blood decreased; Heart burn; and Trigger ring finger of left hand.    Patient Active Problem List    Diagnosis Date Noted   • Eustachian tube dysfunction, left 04/05/2018   • Benign prostatic hyperplasia without  lower urinary tract symptoms 03/27/2018   • Other insomnia 03/27/2018   • Mixed hyperlipidemia 03/27/2018   • Gastroesophageal reflux disease without esophagitis 03/27/2018   • Anxiety 03/27/2018   • Chronic constipation 03/27/2018   • Acute pain of left shoulder 03/27/2018   • History of trigger finger 04/22/2014       ROS   No fever or chills.  No nausea or vomiting.  No chest pain or palpitations.  No cough or SOB.  No pain with urination or hematuria.  No black or bloody stools.       Objective:     Blood pressure 130/74, pulse 61, temperature 36.7 °C (98 °F), weight 81.6 kg (180 lb), SpO2 96 %. Body mass index is 25.83 kg/m².   Physical Exam:  Well developed, well nourished.  Alert, oriented in no acute distress.  Eye contact is good, speech goal directed, affect calm  Eyes: conjunctiva non-injected, sclera non-icteric.  Ears: Pinna normal. TM pearly gray with some clear serous fluid behind the left drum  Nose: Nares are patent.  Normal mucosa  Mouth: Oral mucous membranes pink and moist with no lesions.  Neck Supple.  No adenopathy or masses in the neck or supraclavicular regions. No thyromegaly  Lungs: clear to auscultation bilaterally with good excursion. No wheezes or rhonchi  CV: regular rate and rhythm. No murmur            Assessment and Plan:   The following treatment plan was discussed    1. Eustachian tube dysfunction, left  Medrol Dosepak as directed. If not improving refer to ENT.  - methylPREDNISolone (MEDROL) 4 MG Tab; Take 1 Tab by mouth every day.  Dispense: 30 Tab; Refill: 0    Any change or worsening of signs or symptoms, patient encouraged to follow-up or report to the emergency room for further evaluation. Patient understands and agrees.    Followup: Return if symptoms worsen or fail to improve.

## 2018-04-10 NOTE — ASSESSMENT & PLAN NOTE
He complains of a popping in his left ear as well as pain in the area. The symptoms seem to come and go but have recently worsened. He has some trouble hearing out of that ear. He denies any fever or chills. No rhinorrhea. No trauma to the ear.

## 2018-05-16 DIAGNOSIS — H69.92 EUSTACHIAN TUBE DYSFUNCTION, LEFT: ICD-10-CM

## 2018-05-16 RX ORDER — METHYLPREDNISOLONE 4 MG/1
4 TABLET ORAL DAILY
Qty: 30 TAB | Refills: 0 | OUTPATIENT
Start: 2018-05-16

## 2018-05-16 NOTE — TELEPHONE ENCOUNTER
Great!  Thank you so much!    MANUEL Dudley.   You; So Vicky Hopper 18 minutes ago (3:30 PM)      Please call patient and asked him to schedule an appointment to discuss ongoing symptoms (Routing comment)

## 2018-05-16 NOTE — TELEPHONE ENCOUNTER
From: Gt Martinez  Sent: 5/16/2018 7:57 AM PDT  Subject: Medication Renewal Request    Gt Martinez would like a refill of the following medications:     methylPREDNISolone (MEDROL) 4 MG Tab [Rupali Vidales M.D.]   Patient Comment: Was working then after came back, would like to refill    Preferred pharmacy: [facility] #[id number] - [place], [place] - [address] [place] [address]

## 2018-05-16 NOTE — TELEPHONE ENCOUNTER
This medication was filled by Dr. Vidales at patient's most recent visit.  If Dr. Vidales is out of office, let me know, and I'll cancel this prescription, as I would need an office visit to evaluate his current condition.

## 2018-05-17 ENCOUNTER — OFFICE VISIT (OUTPATIENT)
Dept: MEDICAL GROUP | Facility: MEDICAL CENTER | Age: 83
End: 2018-05-17
Payer: MEDICARE

## 2018-05-17 VITALS
OXYGEN SATURATION: 96 % | HEART RATE: 78 BPM | HEIGHT: 70 IN | DIASTOLIC BLOOD PRESSURE: 74 MMHG | BODY MASS INDEX: 25.62 KG/M2 | SYSTOLIC BLOOD PRESSURE: 126 MMHG | WEIGHT: 179 LBS | TEMPERATURE: 98.9 F

## 2018-05-17 DIAGNOSIS — J30.1 SEASONAL ALLERGIC RHINITIS DUE TO POLLEN: ICD-10-CM

## 2018-05-17 DIAGNOSIS — F41.9 ANXIETY: ICD-10-CM

## 2018-05-17 DIAGNOSIS — G47.09 OTHER INSOMNIA: ICD-10-CM

## 2018-05-17 DIAGNOSIS — H69.92 EUSTACHIAN TUBE DYSFUNCTION, LEFT: ICD-10-CM

## 2018-05-17 PROBLEM — J30.2 SEASONAL ALLERGIES: Status: ACTIVE | Noted: 2018-05-17

## 2018-05-17 PROCEDURE — 99214 OFFICE O/P EST MOD 30 MIN: CPT | Performed by: FAMILY MEDICINE

## 2018-05-17 RX ORDER — DIAZEPAM 5 MG/1
5 TABLET ORAL
Qty: 30 TAB | Refills: 0 | Status: SHIPPED | OUTPATIENT
Start: 2018-06-16 | End: 2018-05-17

## 2018-05-17 RX ORDER — ESZOPICLONE 2 MG/1
1-2 TABLET, FILM COATED ORAL
Qty: 90 TAB | Refills: 0 | Status: SHIPPED | OUTPATIENT
Start: 2018-05-17 | End: 2018-08-15

## 2018-05-17 RX ORDER — PREDNISONE 10 MG/1
10 TABLET ORAL 2 TIMES DAILY
Qty: 10 TAB | Refills: 0 | Status: SHIPPED | OUTPATIENT
Start: 2018-05-17 | End: 2018-05-22

## 2018-05-17 RX ORDER — DIAZEPAM 5 MG/1
5 TABLET ORAL
Qty: 30 TAB | Refills: 0 | Status: SHIPPED | OUTPATIENT
Start: 2018-07-16 | End: 2018-08-15

## 2018-05-17 RX ORDER — DIAZEPAM 5 MG/1
5 TABLET ORAL
Qty: 30 TAB | Refills: 0 | Status: SHIPPED | OUTPATIENT
Start: 2018-05-17 | End: 2018-05-17 | Stop reason: SDUPTHER

## 2018-05-20 NOTE — ASSESSMENT & PLAN NOTE
Chronic, stable, well controlled on Lunesta 1-2 mg p.o. nightly as needed, still is taking this typically 2-3 times per week. No excessive caffeine, no illicit drug use.    ROS is NEGATIVE for generalized weakness/fatigue, headaches upon awakening, daytime hypersomnolence, dyspnea, tachypnea, respiratory distress, cyanotic skin changes.

## 2018-05-20 NOTE — ASSESSMENT & PLAN NOTE
Chronic problem, uncontrolled, patient was recently evaluated by my colleague, Dr. Rupali Vidales.  Patient was thought to have eustachian tube dysfunction and was addressed with Medrol Dosepak.  Patient states that he did respond well to the Medrol Dosepak, however that patient has had a return of symptoms.    Patient has had other allergy type symptoms as listed below.  Patient is not taking any allergy medications at present time and we reviewed the differences between first-generation second-generation antihistamines    ROS is POSITIVE for increased lacrimation, b/l itchy eyes,  post-nasal drip, rhinorrhea, sneezing, otherwise is NEGATIVE wheezing, dyspnea, respiratory distress, palpitations, tachycardia, rash, generalized pruritis, rash/hives.

## 2018-05-20 NOTE — ASSESSMENT & PLAN NOTE
Chronic, stable, well controlled.  This is well controlled on taking Valium 5 mg p.o. daily as needed, which is still typically 1-2 times per week.    ROS is NEGATIVE for generalized weakness/fatigue, dizziness, vision/hearing changes, chest pain/pressure, palpitations, dyspnea, tachypnea, intense feelings of dread, insomnia, decreased appetite, persistent nausea.

## 2018-05-20 NOTE — ASSESSMENT & PLAN NOTE
Acute exacerbation of chronic condition.  Uncontrolled.  Please see notes from same date of service 5/17/2018 regarding eustachian tube dysfunction.

## 2018-05-20 NOTE — PROGRESS NOTES
Subjective:   Chief Complaint/History of Present Illness:  Gt Martinez is a 82 y.o. male established patient who presents today to discuss medical problems as listed below    Diagnoses of Eustachian tube dysfunction, left, Seasonal allergic rhinitis due to pollen, Anxiety, and Other insomnia were pertinent to this visit.    Eustachian tube dysfunction, left  Chronic problem, uncontrolled, patient was recently evaluated by my colleague, Dr. Rupali Vidales.  Patient was thought to have eustachian tube dysfunction and was addressed with Medrol Dosepak.  Patient states that he did respond well to the Medrol Dosepak, however that patient has had a return of symptoms.    Patient has had other allergy type symptoms as listed below.  Patient is not taking any allergy medications at present time and we reviewed the differences between first-generation second-generation antihistamines    ROS is POSITIVE for increased lacrimation, b/l itchy eyes,  post-nasal drip, rhinorrhea, sneezing, otherwise is NEGATIVE wheezing, dyspnea, respiratory distress, palpitations, tachycardia, rash, generalized pruritis, rash/hives.    Seasonal allergies  Acute exacerbation of chronic condition.  Uncontrolled.  Please see notes from same date of service 5/17/2018 regarding eustachian tube dysfunction.    Anxiety  Chronic, stable, well controlled.  This is well controlled on taking Valium 5 mg p.o. daily as needed, which is still typically 1-2 times per week.    ROS is NEGATIVE for generalized weakness/fatigue, dizziness, vision/hearing changes, chest pain/pressure, palpitations, dyspnea, tachypnea, intense feelings of dread, insomnia, decreased appetite, persistent nausea.    Other insomnia  Chronic, stable, well controlled on Lunesta 1-2 mg p.o. nightly as needed, still is taking this typically 2-3 times per week. No excessive caffeine, no illicit drug use.    ROS is NEGATIVE for generalized weakness/fatigue, headaches upon awakening, daytime  hypersomnolence, dyspnea, tachypnea, respiratory distress, cyanotic skin changes.      Patient Active Problem List    Diagnosis Date Noted   • Seasonal allergies 05/17/2018   • Eustachian tube dysfunction, left 04/05/2018   • Benign prostatic hyperplasia without lower urinary tract symptoms 03/27/2018   • Other insomnia 03/27/2018   • Mixed hyperlipidemia 03/27/2018   • Gastroesophageal reflux disease without esophagitis 03/27/2018   • Anxiety 03/27/2018   • Chronic constipation 03/27/2018   • Acute pain of left shoulder 03/27/2018   • History of trigger finger 04/22/2014       Additional History:   Allergies:    Patient has no known allergies.     Current Medications:     Current Outpatient Prescriptions   Medication Sig Dispense Refill   • predniSONE (DELTASONE) 10 MG Tab Take 1 Tab by mouth 2 times a day for 5 days. 10 Tab 0   • Eszopiclone 2 MG Tab Take 0.5-1 Tabs by mouth 1 time daily as needed for up to 90 days. 90 Tab 0   • [START ON 7/16/2018] diazePAM (VALIUM) 5 MG Tab Take 1 Tab by mouth 1 time daily as needed for up to 30 days. 30 Tab 0   • magnesium oxide (MAG-OX) 400 (241.3 Mg) MG Tab tablet Take 1 Tab by mouth every day. 30 Tab 1   • dutasteride (AVODART) 0.5 MG capsule Take 1 Cap by mouth every day. 90 Cap 3   • AMITIZA 24 MCG capsule      • lansoprazole (PREVACID) 30 MG CAPSULE DELAYED RELEASE      • Potassium (POTASSIMIN PO) Take  by mouth.     • meloxicam (MOBIC) 7.5 MG TABS Take 7.5 mg by mouth every day.     • aspirin EC (ECOTRIN) 81 MG TBEC Take 81 mg by mouth every day.     • docosahexanoic acid (OMEGA 3 FA) 1000 MG CAPS Take 1,000 mg by mouth every day.     • therapeutic multivitamin-minerals (THERAGRAN-M) TABS Take 1 Tab by mouth every day.     • Misc Natural Products (OSTEO BI-FLEX ADV DOUBLE ST) TABS Take  by mouth every day.     • simvastatin (ZOCOR) 40 MG TABS Take 1 Tab by mouth every evening.       No current facility-administered medications for this visit.         Social  "History:     Social History   Substance Use Topics   • Smoking status: Never Smoker   • Smokeless tobacco: Never Used   • Alcohol use 3.0 oz/week     5 Glasses of wine per week      Comment: 2-3 per week       ROS:     - NOTE: All other systems reviewed and are negative, except as in HPI.     Objective:   Physical Exam:    Vitals: Blood pressure 126/74, pulse 78, temperature 37.2 °C (98.9 °F), height 1.778 m (5' 10\"), weight 81.2 kg (179 lb), SpO2 96 %.   BMI: Body mass index is 25.68 kg/m².   General/Constitutional: Vitals as above, Well nourished, well developed male in no acute distress   Head/Eyes: Head is grossly normal & atraumatic, bilateral conjunctivae not injected, bilateral EOMI, bilateral PERRL   ENT: Bilateral external ears grossly normal in appearance, Hearing grossly intact, bilateral external canals without cerumen impaction, bilateral TMs are able to the visualized with mild disc bulging without exudates nor erythema on left side, External nares normal in appearance and without discharge/bleeding, bilateral frontal/maxillary sinuses mildly tender to palpation, posterior oropharynx with mild cobblestoning but without exudates and without airway obstruction   Respiratory: No respiratory distress, bilateral lungs are clear to ausculation in all lung fields (anterior/lateral/posterior), no wheezing/rhonchi/rales   Cardiovascular: Regular rate and rhythm without murmur/gallops/rubs, distal pulses are intact and equal bilaterally (radial, posterior tibial), no bilateral lower extremity edema   MSK: Gait grossly normal & not antalgic   Integumentary: No apparent rashes   Psych: Judgment grossly appropriate, no apparent depression/anxiety    Health Maintenance:     - 03/28/18 -- UDS POSITIVE for cannabinoids --> patient admits to intermittently using cannabinoids for relaxation purposes,    Imaging/Labs:     - 08/07/17 -- Colonoscopy Moderate diverticulosis, Grade 3 internal hemorrhoids    Assessment and " Plan:   1. Eustachian tube dysfunction, left  Chronic, uncontrolled, likely due to allergic etiology.  Patient will therefore take a steroid burst as below.  Patient to also start taking nonsedating antihistamines on a daily basis with start of prednisone, then switch to sedating antihistamines nightly as needed (diphenhydramine 12.5-25 mg) if he needs further help.   - predniSONE (DELTASONE) 10 MG Tab; Take 1 Tab by mouth 2 times a day for 5 days.  Dispense: 10 Tab; Refill: 0    2. Seasonal allergic rhinitis due to pollen  New problem, uncontrolled, address as in #1 above    3. Anxiety  Chronic, stable, well controlled.  Take Valium as below on an as-needed basis (patient given 3 one-month fills).   - diazePAM (VALIUM) 5 MG Tab; Take 1 Tab by mouth 1 time daily as needed for up to 30 days.  Dispense: 30 Tab; Refill: 0    4. Other insomnia  Chronic, stable, well controlled.  Take Lunesta on an as-needed basis as below.   - Eszopiclone 2 MG Tab; Take 0.5-1 Tabs by mouth 1 time daily as needed for up to 90 days.  Dispense: 90 Tab; Refill: 0        RTC: in 3 months for follow-up of anxiety and insomnia.    PLEASE NOTE: This dictation was created using voice recognition software. I have made every reasonable attempt to correct obvious errors, but I expect that there are errors of grammar and possibly content that I did not discover before finalizing the note.

## 2018-06-07 ENCOUNTER — OFFICE VISIT (OUTPATIENT)
Dept: MEDICAL GROUP | Facility: MEDICAL CENTER | Age: 83
End: 2018-06-07
Payer: MEDICARE

## 2018-06-07 VITALS
HEART RATE: 66 BPM | DIASTOLIC BLOOD PRESSURE: 66 MMHG | BODY MASS INDEX: 25.77 KG/M2 | SYSTOLIC BLOOD PRESSURE: 132 MMHG | RESPIRATION RATE: 20 BRPM | TEMPERATURE: 97.8 F | HEIGHT: 70 IN | OXYGEN SATURATION: 97 % | WEIGHT: 180 LBS

## 2018-06-07 DIAGNOSIS — R60.9 PERIPHERAL EDEMA: ICD-10-CM

## 2018-06-07 DIAGNOSIS — R01.1 CARDIAC MURMUR: ICD-10-CM

## 2018-06-07 PROBLEM — R60.0 PERIPHERAL EDEMA: Status: ACTIVE | Noted: 2018-06-07

## 2018-06-07 PROCEDURE — 99214 OFFICE O/P EST MOD 30 MIN: CPT | Performed by: FAMILY MEDICINE

## 2018-06-07 NOTE — PATIENT INSTRUCTIONS
"2 Gram Low Sodium Diet  A 2 gram sodium diet restricts the amount of sodium in the diet to no more than 2 g or 2000 mg daily. Limiting the amount of sodium is often used to help lower blood pressure. It is important if you have heart, liver, or kidney problems. Many foods contain sodium for flavor and sometimes as a preservative. When the amount of sodium in a diet needs to be low, it is important to know what to look for when choosing foods and drinks. The following includes some information and guidelines to help make it easier for you to adapt to a low sodium diet.  QUICK TIPS  · Do not add salt to food.  · Avoid convenience items and fast food.  · Choose unsalted snack foods.  · Buy lower sodium products, often labeled as \"lower sodium\" or \"no salt added.\"  · Check food labels to learn how much sodium is in 1 serving.  · When eating at a restaurant, ask that your food be prepared with less salt or none, if possible.  READING FOOD LABELS FOR SODIUM INFORMATION  The nutrition facts label is a good place to find how much sodium is in foods. Look for products with no more than 500 to 600 mg of sodium per meal and no more than 150 mg per serving.  Remember that 2 g = 2000 mg.  The food label may also list foods as:  · Sodium-free: Less than 5 mg in a serving.  · Very low sodium: 35 mg or less in a serving.  · Low-sodium: 140 mg or less in a serving.  · Light in sodium: 50% less sodium in a serving. For example, if a food that usually has 300 mg of sodium is changed to become light in sodium, it will have 150 mg of sodium.  · Reduced sodium: 25% less sodium in a serving. For example, if a food that usually has 400 mg of sodium is changed to reduced sodium, it will have 300 mg of sodium.  CHOOSING FOODS  Grains  · Avoid: Salted crackers and snack items. Some cereals, including instant hot cereals. Bread stuffing and biscuit mixes. Seasoned rice or pasta mixes.  · Choose: Unsalted snack items. Low-sodium cereals, oats, " puffed wheat and rice, shredded wheat. English muffins and bread. Pasta.  Meats  · Avoid: Salted, canned, smoked, spiced, pickled meats, including fish and poultry. Thibodeaux, ham, sausage, cold cuts, hot dogs, anchovies.  · Choose: Low-sodium canned tuna and salmon. Fresh or frozen meat, poultry, and fish.  Dairy  · Avoid: Processed cheese and spreads. Cottage cheese. Buttermilk and condensed milk. Regular cheese.  · Choose: Milk. Low-sodium cottage cheese. Yogurt. Sour cream. Low-sodium cheese.  Fruits and Vegetables  · Avoid: Regular canned vegetables. Regular canned tomato sauce and paste. Frozen vegetables in sauces. Olives. Pickles. Relishes. Sauerkraut.  · Choose: Low-sodium canned vegetables. Low-sodium tomato sauce and paste. Frozen or fresh vegetables. Fresh and frozen fruit.  Condiments  · Avoid: Canned and packaged gravies. Worcestershire sauce. Tartar sauce. Barbecue sauce. Soy sauce. Steak sauce. Ketchup. Onion, garlic, and table salt. Meat flavorings and tenderizers.  · Choose: Fresh and dried herbs and spices. Low-sodium varieties of mustard and ketchup. Lemon juice. Tabasco sauce. Horseradish.  SAMPLE 2 GRAM SODIUM MEAL PLAN  Breakfast / Sodium (mg)  · 1 cup low-fat milk / 143 mg  · 2 slices whole-wheat toast / 270 mg  · 1 tbs heart-healthy margarine / 153 mg  · 1 hard-boiled egg / 139 mg  · 1 small orange / 0 mg  Lunch / Sodium (mg)  · 1 cup raw carrots / 76 mg  · ½ cup hummus / 298 mg  · 1 cup low-fat milk / 143 mg  · ½ cup red grapes / 2 mg  · 1 whole-wheat stephanie bread / 356 mg  Dinner / Sodium (mg)  · 1 cup whole-wheat pasta / 2 mg  · 1 cup low-sodium tomato sauce / 73 mg  · 3 oz lean ground beef / 57 mg  · 1 small side salad (1 cup raw spinach leaves, ½ cup cucumber, ¼ cup yellow bell pepper) with 1 tsp olive oil and 1 tsp red wine vinegar / 25 mg  Snack / Sodium (mg)  · 1 container low-fat vanilla yogurt / 107 mg  · 3 betsy cracker squares / 127 mg  Nutrient Analysis  · Calories: 2033  · Protein:  77 g  · Carbohydrate: 282 g  · Fat: 72 g  · Sodium: 1971 mg  Document Released: 12/18/2006 Document Revised: 03/11/2013 Document Reviewed: 03/21/2011  [employer]® Patient Information ©2014 [employer].  Peripheral Edema  Peripheral edema is swelling that is caused by a buildup of fluid. Peripheral edema most often affects the lower legs, ankles, and feet. It can also develop in the arms, hands, and face. The area of the body that has peripheral edema will look swollen. It may also feel heavy or warm. Your clothes may start to feel tight. Pressing on the area may make a temporary dent in your skin. You may not be able to move your arm or leg as much as usual.  There are many causes of peripheral edema. It can be a complication of other diseases, such as congestive heart failure, kidney disease, or a problem with your blood circulation. It also can be a side effect of certain medicines. It often happens to women during pregnancy. Sometimes, the cause is not known. Treating the underlying condition is often the only treatment for peripheral edema.  Follow these instructions at home:  Pay attention to any changes in your symptoms. Take these actions to help with your discomfort:  · Raise (elevate) your legs while you are sitting or lying down.  · Move around often to prevent stiffness and to lessen swelling. Do not sit or stand for long periods of time.  · Wear support stockings as told by your health care provider.  · Follow instructions from your health care provider about limiting salt (sodium) in your diet. Sometimes eating less salt can reduce swelling.  · Take over-the-counter and prescription medicines only as told by your health care provider. Your health care provider may prescribe medicine to help your body get rid of excess water (diuretic).  · Keep all follow-up visits as told by your health care provider. This is important.  Contact a health care provider if:  · You have a fever.  · Your edema starts  suddenly or is getting worse, especially if you are pregnant or have a medical condition.  · You have swelling in only one leg.  · You have increased swelling and pain in your legs.  Get help right away if:  · You develop shortness of breath, especially when you are lying down.  · You have pain in your chest or abdomen.  · You feel weak.  · You faint.  This information is not intended to replace advice given to you by your health care provider. Make sure you discuss any questions you have with your health care provider.  Document Released: 01/25/2006 Document Revised: 05/22/2017 Document Reviewed: 06/29/2016  [employer] Interactive Patient Education © 2017 [employer]

## 2018-06-07 NOTE — ASSESSMENT & PLAN NOTE
Patient complains a tightness in his ankles for the last couple months.  He does get some swelling in that area but not every day.  Sometimes extends up to the knees but the denies SOB.  He has not been weighing himself daily.  He does watch his sodium intake.

## 2018-06-15 NOTE — PROGRESS NOTES
Subjective:     Chief Complaint   Patient presents with   • Edema     lower extremity swelling x 2 months; feet & knees, nonpainful, reduced range of motion       Gt Martinez is a 82 y.o. male here today for evaluation and management of:    Peripheral edema  Patient complains a tightness in his ankles for the last couple months.  He does get some swelling in that area but not every day.  Sometimes extends up to the knees but the denies SOB.  He has not been weighing himself daily.  He does watch his sodium intake.       No Known Allergies    Current medicines (including changes today)  Current Outpatient Prescriptions   Medication Sig Dispense Refill   • TURMERIC PO Take  by mouth.     • magnesium oxide (MAG-OX) 400 (241.3 Mg) MG Tab tablet Take 1 Tab by mouth every day. 30 Tab 1   • dutasteride (AVODART) 0.5 MG capsule Take 1 Cap by mouth every day. 90 Cap 3   • AMITIZA 24 MCG capsule      • lansoprazole (PREVACID) 30 MG CAPSULE DELAYED RELEASE      • aspirin EC (ECOTRIN) 81 MG TBEC Take 81 mg by mouth every day.     • simvastatin (ZOCOR) 40 MG TABS Take 1 Tab by mouth every evening.     • Eszopiclone 2 MG Tab Take 0.5-1 Tabs by mouth 1 time daily as needed for up to 90 days. 90 Tab 0   • [START ON 7/16/2018] diazePAM (VALIUM) 5 MG Tab Take 1 Tab by mouth 1 time daily as needed for up to 30 days. 30 Tab 0   • Potassium (POTASSIMIN PO) Take  by mouth.     • meloxicam (MOBIC) 7.5 MG TABS Take 7.5 mg by mouth every day.     • docosahexanoic acid (OMEGA 3 FA) 1000 MG CAPS Take 1,000 mg by mouth every day.     • therapeutic multivitamin-minerals (THERAGRAN-M) TABS Take 1 Tab by mouth every day.     • Misc Natural Products (OSTEO BI-FLEX ADV DOUBLE ST) TABS Take  by mouth every day.       No current facility-administered medications for this visit.        He  has a past medical history of Cholesterol blood decreased; Heart burn; and Trigger ring finger of left hand.    Patient Active Problem List    Diagnosis Date  "Noted   • Peripheral edema 06/07/2018   • Cardiac murmur 06/07/2018   • Seasonal allergies 05/17/2018   • Eustachian tube dysfunction, left 04/05/2018   • Benign prostatic hyperplasia without lower urinary tract symptoms 03/27/2018   • Other insomnia 03/27/2018   • Mixed hyperlipidemia 03/27/2018   • Gastroesophageal reflux disease without esophagitis 03/27/2018   • Anxiety 03/27/2018   • Chronic constipation 03/27/2018   • Acute pain of left shoulder 03/27/2018   • History of trigger finger 04/22/2014       ROS   No fever or chills.  No nausea or vomiting.  No chest pain or palpitations.  No cough or SOB.  No pain with urination or hematuria.  No black or bloody stools.       Objective:     Blood pressure 132/66, pulse 66, temperature 36.6 °C (97.8 °F), resp. rate 20, height 1.778 m (5' 10\"), weight 81.6 kg (180 lb), SpO2 97 %. Body mass index is 25.83 kg/m².   Physical Exam:  Well developed, well nourished.  Alert, oriented in no acute distress.  Eye contact is good, speech goal directed, affect calm  Eyes: conjunctiva non-injected, sclera non-icteric.  Neck Supple.  No adenopathy or masses in the neck or supraclavicular regions. No thyromegaly  Lungs: clear to auscultation bilaterally with good excursion. No wheezes or rhonchi  CV: regular rate and rhythm.  3/6 systolic ejection murmur  Ext: 2+ nonpitting edema to midtibia bilaterally, color normal, vascularity normal, temperature normal          Assessment and Plan:   The following treatment plan was discussed    1. Peripheral edema  Look at secondary causes.  Discussed compression stockings and elevation.  Monitor salt intake.  Await results  - ECHOCARDIOGRAM COMP W/O CONT; Future  - COMP METABOLIC PANEL; Future  - BTYPE NATRIURETIC PEPTIDE; Future    2. Cardiac murmur  See above  - ECHOCARDIOGRAM COMP W/O CONT; Future  - COMP METABOLIC PANEL; Future  - BTYPE NATRIURETIC PEPTIDE; Future    Any change or worsening of signs or symptoms, patient encouraged to " follow-up or report to the emergency room for further evaluation. Patient understands and agrees.    Followup: Return if symptoms worsen or fail to improve.

## 2018-07-05 ENCOUNTER — HOSPITAL ENCOUNTER (OUTPATIENT)
Dept: RADIOLOGY | Facility: MEDICAL CENTER | Age: 83
End: 2018-07-05
Attending: FAMILY MEDICINE
Payer: MEDICARE

## 2018-07-05 ENCOUNTER — OFFICE VISIT (OUTPATIENT)
Dept: MEDICAL GROUP | Facility: MEDICAL CENTER | Age: 83
End: 2018-07-05
Payer: MEDICARE

## 2018-07-05 VITALS
DIASTOLIC BLOOD PRESSURE: 60 MMHG | HEIGHT: 70 IN | RESPIRATION RATE: 16 BRPM | OXYGEN SATURATION: 97 % | TEMPERATURE: 98.7 F | SYSTOLIC BLOOD PRESSURE: 120 MMHG | BODY MASS INDEX: 26.2 KG/M2 | WEIGHT: 183 LBS | HEART RATE: 67 BPM

## 2018-07-05 DIAGNOSIS — M79.645 PAIN OF LEFT THUMB: ICD-10-CM

## 2018-07-05 DIAGNOSIS — M72.0 DUPUYTREN CONTRACTURE: ICD-10-CM

## 2018-07-05 PROCEDURE — 73140 X-RAY EXAM OF FINGER(S): CPT | Mod: LT

## 2018-07-05 PROCEDURE — 99214 OFFICE O/P EST MOD 30 MIN: CPT | Performed by: FAMILY MEDICINE

## 2018-07-05 NOTE — PATIENT INSTRUCTIONS
Thumb Sprain  A thumb sprain is an injury to one of the strong bands of tissue (ligaments) that connect the bones in your thumb. The ligament can be stretched too much or it can tear. A tear can be either partial or complete. The severity of the sprain depends on how much of the ligament was damaged or torn.  What are the causes?  A thumb sprain is often caused by a fall or an accident. If you extend your hands to catch an object or to protect yourself, the force of the impact can cause your ligament to stretch too much. This excess tension can also cause your ligament to tear.  What increases the risk?  This injury is more likely to occur in people who play:  · Sports that involve a greater risk of falling, such as skiing.  · Sports that involve catching an object, such as basketball.  What are the signs or symptoms?  Symptoms of this condition include:  · Loss of motion in your thumb.  · Bruising.  · Tenderness.  · Swelling.  How is this diagnosed?  This condition is diagnosed with a medical history and physical exam. You may also have an X-ray of your thumb.  How is this treated?  Treatment varies depending on the severity of your sprain. If your ligament is overstretched or partially torn, treatment usually involves keeping your thumb in a fixed position (immobilization) for a period of time. To help you do this, your health care provider will apply a bandage, cast, or splint to keep your thumb from moving until it heals.  If your ligament is fully torn, you may need surgery to reconnect the ligament to the bone. After surgery, a cast or splint will be applied and will need to stay on your thumb while it heals.  Your health care provider may also suggest exercises or physical therapy to strengthen your thumb.  Follow these instructions at home:  If you have a cast:  · Do not stick anything inside the cast to scratch your skin. Doing that increases your risk of infection.  · Check the skin around the cast every  day. Report any concerns to your health care provider. You may put lotion on dry skin around the edges of the cast. Do not apply lotion to the skin underneath the cast.  · Keep the cast clean and dry.  If you have a splint:  · Wear it as directed by your health care provider. Remove it only as directed by your health care provider.  · Loosen the splint if your fingers become numb and tingle, or if they turn cold and blue.  · Keep the splint clean and dry.  Bathing  · Cover the bandage, cast, or splint with a watertight plastic bag to protect it from water while you take a bath or a shower. Do not let the bandage, cast, or splint get wet.  Managing pain, stiffness, and swelling  · If directed, apply ice to the injured area (unless you have a cast):  ¨ Put ice in a plastic bag.  ¨ Place a towel between your skin and the bag.  ¨ Leave the ice on for 20 minutes, 2-3 times per day.  · Move your fingers often to avoid stiffness and to lessen swelling.  · Raise (elevate) the injured area above the level of your heart while you are sitting or lying down.  Driving  · Do not drive or operate heavy machinery while taking pain medicine.  · Do not drive while wearing a cast or splint on a hand that you use for driving.  General instructions  · Do not put pressure on any part of your cast or splint until it is fully hardened. This may take several hours.  · Take medicines only as directed by your health care provider. These include over-the-counter medicines and prescription medicines.  · Keep all follow-up visits as directed by your health care provider. This is important.  · Do any exercise or physical therapy as directed by your health care provider.  · Do not wear rings on your injured thumb.  Contact a health care provider if:  · Your pain is not controlled with medicine.  · Your bruising or swelling gets worse.  · Your cast or splint is damaged.  Get help right away if:  · Your thumb is numb or blue.  · Your thumb feels  colder than normal.  This information is not intended to replace advice given to you by your health care provider. Make sure you discuss any questions you have with your health care provider.  Document Released: 01/25/2006 Document Revised: 08/20/2017 Document Reviewed: 09/29/2015  [employer] Interactive Patient Education © 2017 [employer]  Dupuytren's Contracture  Dupuytren's contracture affects the fingers and the palm of the hand. This condition usually develops slowly. It may take many years to develop. The pinky finger and the ring finger are most often affected. These fingers start to curve inward, like a claw. At some point, the fingers cannot go straight anymore. This can make it hard to do things like:  · Put on gloves.  · Shake hands.  · Grab something off a shelf.  The condition usually does not cause pain and is not dangerous.  The condition gets its name from the doctor who came up with an operation to fix the problem. His name was Baron Longoria Nate. Contracture means pulling inward.  CAUSES   Dupuytren's contracture does not start with the fingers. It starts in the palm of the hand, under the skin. The tissue under the skin is called fascia. The fascia covers the cords (tendons) that control how the fingers move. In Dupuytren's contracture the fascia tissue becomes thick and then pulls on the cords. That causes the fingers to curl. The condition can affect both hands and any fingers, but it usually strikes one hand worse than the other. The fingers farthest from the thumb are most often the ones that curl. The cause is not clear. Some experts believe it results from an autoimmune reaction. That means the body's immune system (which fights off disease) attacks itself by mistake. What experts do know is that certain conditions and behaviors (called risk factors) make the chance of having this condition more likely. They include:  · Age. Most people who have the condition are older than  50.  · Sex. It affects men more often than women.  · Family history. The condition tends to run in families from countries in [place] and [place].  · Certain behaviors. People who smoke and drink alcohol are more apt to develop the problem.  · Some other medical conditions. Having diabetes makes Dupuytren's contracture more likely. So does having a condition that involves a seizure (when the brain's function is interrupted).  SYMPTOMS   Signs of this condition take time to develop. Sometimes this takes weeks or months. More often, it takes several years.   · Early symptoms:  ¨ Skin on the palm of the hand becomes thick. This is usually the first sign.  ¨ The skin may look dimpled or puckered.  ¨ Lumps (nodules) show up on the palm. There may be one or more lumps. They are not painful.  · Later symptoms:  ¨ Thick cords of tissue form in the palm of the hand.  ¨ The pinky and ring fingers start to curl up into the palm.  ¨ The fingers cannot be straightened into their normal position.  DIAGNOSIS   A physical examination is the main way that a healthcare provider can tell if you have Dupuytren's contracture. Other tests usually are not needed. The caregiver will probably:  · Look at your hands. Feel your hands. This is to check for thickening and nodules.  · Measure finger motion. This tells how much your fingers have contracted (pulled in).  · Do a tabletop test. You will be asked to try to put your hand flat on a table, palm down.  TREATMENT   There is no cure for Dupuytren's contracture. But there are ways to treat the symptoms. Options include:  · Watching and waiting. The condition develops slowly. Often it does not create problems for a long time. Sometimes the skin gets thick and nodules form, but the fingers never curl. So, in some cases it is best to just watch the condition carefully and wait to see what happens.  · Shots (injections). Different substances may be injected, including:  ¨ Steroids.  These drugs block swelling. These shots should make the condition less uncomfortable. Steroids may also slow down the condition. Shots are given into the nodules. The effect only lasts awhile. More shots may have to be given.  ¨ Enzymes. These are proteins. They weaken the thick tissue. After an injection, the caregiver usually stretches the fingers.  · Needling. A needle is pushed through the skin and into the thick tissue. This is done in several spots. The goal is to break up the thickened tissue. Or to weaken it.  · Surgery. This may be suggested if you cannot grasp objects. Or, if you can no longer put your hand in your pocket.  ¨ A cut (incision) is made in the palm of the hand. The thick tissue is removed.  ¨ Sometimes the thick tissue is attached to the skin. Then, the skin must be removed, too. It is replaced with a piece of skin from another place on your body. That is called a skin graft.  ¨ Occupational or hand therapy is almost always needed after surgery. This involves special exercises to get back the use of your hand and fingers. After a skin graft, several months of therapy may be needed.  ¨ Sometimes the condition comes back, even after surgery.  · Other methods. You can do some things on your own. They include:  ¨ Stretching the fingers backwards. Do this often.  ¨ Warming the hand and massaging it. Again, do this often.  ¨ Using tools with padded . This should make things easier.  ¨ Wearing heavy gloves while working. This protects the hands.  PROGNOSIS   Dupuytren's contracture usually develops slowly. There is no cure. But, the symptoms can be treated. Sometimes they come back after treatment, but not always. It is important to remember that this is a functional problem and not a life-threatening condition.  This information is not intended to replace advice given to you by your health care provider. Make sure you discuss any questions you have with your health care provider.  Document  Released: 10/15/2010 Document Revised: 01/08/2016 Document Reviewed: 05/11/2016  [employer] Interactive Patient Education © 2017 [employer]

## 2018-07-09 ENCOUNTER — TELEPHONE (OUTPATIENT)
Dept: MEDICAL GROUP | Facility: MEDICAL CENTER | Age: 83
End: 2018-07-09

## 2018-07-09 NOTE — TELEPHONE ENCOUNTER
Phone Number Called: [phone] (home)     Message: Called patient notifying him of the results of his recent hand x ray and the referral to a hand surgeon that was placed. Patient stated he will hold off on the surgery at the moment due to his hand feeling better.

## 2018-07-09 NOTE — ASSESSMENT & PLAN NOTE
Patient complains of significant pain in the base of his left thumb. He denies any injury but he is having difficulty opening jars or anything that requires a twisting motion. He reports that the pain is starting to limit his activities. He denies any numbness or tingling.

## 2018-07-09 NOTE — PROGRESS NOTES
Subjective:     Chief Complaint   Patient presents with   • Finger Injury     left thumb/ injury about 20 years ago       Gt Martinez is a 83 y.o. male here today for evaluation and management of:    Pain of left thumb  Patient complains of significant pain in the base of his left thumb. He denies any injury but he is having difficulty opening jars or anything that requires a twisting motion. He reports that the pain is starting to limit his activities. He denies any numbness or tingling.       No Known Allergies    Current medicines (including changes today)  Current Outpatient Prescriptions   Medication Sig Dispense Refill   • TURMERIC PO Take  by mouth.     • magnesium oxide (MAG-OX) 400 (241.3 Mg) MG Tab tablet Take 1 Tab by mouth every day. 30 Tab 1   • dutasteride (AVODART) 0.5 MG capsule Take 1 Cap by mouth every day. 90 Cap 3   • AMITIZA 24 MCG capsule      • lansoprazole (PREVACID) 30 MG CAPSULE DELAYED RELEASE      • Potassium (POTASSIMIN PO) Take  by mouth.     • meloxicam (MOBIC) 7.5 MG TABS Take 7.5 mg by mouth every day.     • aspirin EC (ECOTRIN) 81 MG TBEC Take 81 mg by mouth every day.     • docosahexanoic acid (OMEGA 3 FA) 1000 MG CAPS Take 1,000 mg by mouth every day.     • therapeutic multivitamin-minerals (THERAGRAN-M) TABS Take 1 Tab by mouth every day.     • Misc Natural Products (OSTEO BI-FLEX ADV DOUBLE ST) TABS Take  by mouth every day.     • simvastatin (ZOCOR) 40 MG TABS Take 1 Tab by mouth every evening.     • Eszopiclone 2 MG Tab Take 0.5-1 Tabs by mouth 1 time daily as needed for up to 90 days. 90 Tab 0   • [START ON 7/16/2018] diazePAM (VALIUM) 5 MG Tab Take 1 Tab by mouth 1 time daily as needed for up to 30 days. 30 Tab 0     No current facility-administered medications for this visit.        He  has a past medical history of Cholesterol blood decreased; Heart burn; and Trigger ring finger of left hand.    Patient Active Problem List    Diagnosis Date Noted   • Pain of left thumb  "07/05/2018   • Dupuytren contracture 07/05/2018   • Peripheral edema 06/07/2018   • Cardiac murmur 06/07/2018   • Seasonal allergies 05/17/2018   • Eustachian tube dysfunction, left 04/05/2018   • Benign prostatic hyperplasia without lower urinary tract symptoms 03/27/2018   • Other insomnia 03/27/2018   • Mixed hyperlipidemia 03/27/2018   • Gastroesophageal reflux disease without esophagitis 03/27/2018   • Anxiety 03/27/2018   • Chronic constipation 03/27/2018   • Acute pain of left shoulder 03/27/2018   • History of trigger finger 04/22/2014       ROS   No fever or chills.  No nausea or vomiting.  No chest pain or palpitations.  No cough or SOB.  No pain with urination or hematuria.  No black or bloody stools.       Objective:     Blood pressure 120/60, pulse 67, temperature 37.1 °C (98.7 °F), resp. rate 16, height 1.778 m (5' 10\"), weight 83 kg (183 lb), SpO2 97 %. Body mass index is 26.26 kg/m².   Physical Exam:  Well developed, well nourished.  Alert, oriented in no acute distress.  Eye contact is good, speech goal directed, affect calm  Eyes: conjunctiva non-injected, sclera non-icteric.  Lungs: clear to auscultation bilaterally with good excursion. No wheezes or rhonchi  CV: regular rate and rhythm. No murmur  Wrist/Hand: No deformity, swelling or bruising noted. Tenderness to palpation . Nfirst MCP jointo tenderness at snuffbox. Range of motion intact. Strength and sensation intact.  Good  strength. 2+ radial pulse. Finkelstein's test: Negative. There are fibrous banding in the palm consistent with Dupuytren's contracture.        Assessment and Plan:   The following treatment plan was discussed    1. Pain of left thumb  X-ray to assess. Consider Voltaren gel. Refer to hand surgeon if not improving. Await results  - DX-FINGER(S) 2+ LEFT; Future    2. Dupuytren contracture  Patient education material given. These do not seem to be bothering him at this time. Refer to hand surgeon if they worsen.    Any " change or worsening of signs or symptoms, patient encouraged to follow-up or report to the emergency room for further evaluation. Patient understands and agrees.    Followup: Return if symptoms worsen or fail to improve.

## 2018-08-09 ENCOUNTER — TELEPHONE (OUTPATIENT)
Dept: MEDICAL GROUP | Facility: MEDICAL CENTER | Age: 83
End: 2018-08-09

## 2018-08-09 NOTE — TELEPHONE ENCOUNTER
ESTABLISHED PATIENT PRE-VISIT PLANNING     Note: Patient will not be contacted if there is no indication to call.     1.  Reviewed notes from the last few office visits within the medical group: Yes 05/17/2018    2.  If any orders were placed at last visit or intended to be done for this visit (i.e. 6 mos follow-up), do we have Results/Consult Notes?        •  Labs - Labs ordered, NOT completed. Patient advised to complete prior to next appointment.   Note: If patient appointment is for lab review and patient did not complete labs, check with provider if OK to reschedule patient until labs completed.       •  Imaging - Imaging was not ordered at last office visit.       •  Referrals - No referrals were ordered at last office visit.    3. Is this appointment scheduled as a Hospital Follow-Up? No    4.  Immunizations were updated in [employer] using [employer]?: Yes       •  [employer] Recommendations: PREVNAR (PCV13) , TDAP and ZOSTAVAX (Shingles)    5.  Patient is due for the following Health Maintenance Topics:   Health Maintenance Due   Topic Date Due   • Annual Wellness Visit  1935   • IMM DTaP/Tdap/Td Vaccine (1 - Tdap) 07/03/1956   • IMM ZOSTER VACCINES (1 of 2) 07/03/1987   • IMM PNEUMOCOCCAL 65+ (ADULT) LOW/MEDIUM RISK SERIES (1 of 2 - PCV13) 07/03/2002         6.  MDX printed for Provider? YES    7.  Patient was NOT informed to arrive 15 min prior to their scheduled appointment and bring in their medication bottles.

## 2018-08-15 ENCOUNTER — TELEPHONE (OUTPATIENT)
Dept: MEDICAL GROUP | Facility: MEDICAL CENTER | Age: 83
End: 2018-08-15

## 2018-08-15 NOTE — TELEPHONE ENCOUNTER
VOICEMAIL  1. Caller Name: Gt Martinez                        Call Back Number: [phone] (home)       2. Message: Pt's wife called asking what paperwork they would need to be able to get a help dog for Gt.    3. Patient approves office to leave a detailed voicemail/MyChart message: N\A

## 2018-08-15 NOTE — TELEPHONE ENCOUNTER
"It depends.  If they're asking for help in obtaining a service animal, they'd need to contact an agency that trains service dogs.  That agency could then tell them what sorts of paperwork would need to be filled out, including paperwork that I might need to do.    If they're asking for something along the lines of an \"emotional support dog,\" that depends on the place that they're living (apartment, house rental, assisted living facility, et cetera).  In that case, they'd need to ask their landlord or facility what sort of paperwork they (or I) would have to fill out.  "

## 2018-08-16 ENCOUNTER — APPOINTMENT (OUTPATIENT)
Dept: MEDICAL GROUP | Facility: MEDICAL CENTER | Age: 83
End: 2018-08-16
Payer: MEDICARE